# Patient Record
Sex: MALE | Race: WHITE | NOT HISPANIC OR LATINO | Employment: FULL TIME | URBAN - METROPOLITAN AREA
[De-identification: names, ages, dates, MRNs, and addresses within clinical notes are randomized per-mention and may not be internally consistent; named-entity substitution may affect disease eponyms.]

---

## 2024-04-15 ENCOUNTER — APPOINTMENT (OUTPATIENT)
Dept: RADIOLOGY | Facility: HOSPITAL | Age: 39
End: 2024-04-15
Attending: STUDENT IN AN ORGANIZED HEALTH CARE EDUCATION/TRAINING PROGRAM
Payer: OTHER MISCELLANEOUS

## 2024-04-15 ENCOUNTER — HOSPITAL ENCOUNTER (EMERGENCY)
Facility: HOSPITAL | Age: 39
Discharge: HOME | End: 2024-04-15
Attending: STUDENT IN AN ORGANIZED HEALTH CARE EDUCATION/TRAINING PROGRAM | Admitting: STUDENT IN AN ORGANIZED HEALTH CARE EDUCATION/TRAINING PROGRAM
Payer: OTHER MISCELLANEOUS

## 2024-04-15 VITALS
RESPIRATION RATE: 18 BRPM | HEART RATE: 78 BPM | HEIGHT: 70 IN | BODY MASS INDEX: 40.09 KG/M2 | TEMPERATURE: 98.8 F | WEIGHT: 280 LBS | SYSTOLIC BLOOD PRESSURE: 186 MMHG | OXYGEN SATURATION: 98 % | DIASTOLIC BLOOD PRESSURE: 119 MMHG

## 2024-04-15 DIAGNOSIS — S61.422A LACERATION OF LEFT HAND WITH FOREIGN BODY, INITIAL ENCOUNTER: ICD-10-CM

## 2024-04-15 PROCEDURE — 73130 X-RAY EXAM OF HAND: CPT | Mod: LT

## 2024-04-15 PROCEDURE — 12002 RPR S/N/AX/GEN/TRNK2.6-7.5CM: CPT

## 2024-04-15 PROCEDURE — 99283 EMERGENCY DEPT VISIT LOW MDM: CPT

## 2024-04-15 RX ORDER — CEPHALEXIN 500 MG/1
500 CAPSULE ORAL 4 TIMES DAILY
Qty: 28 CAPSULE | Refills: 0 | Status: SHIPPED | OUTPATIENT
Start: 2024-04-15 | End: 2024-04-22

## 2024-04-15 ASSESSMENT — ACTIVITIES OF DAILY LIVING (ADL)
ADLS_ACUITY_SCORE: 33
ADLS_ACUITY_SCORE: 35

## 2024-04-15 NOTE — Clinical Note
Mak Weiss was seen and treated in our emergency department on 4/15/2024.  He may return to work on 04/18/2024.       If you have any questions or concerns, please don't hesitate to call.      Ohl, Daniel Pepper, DO

## 2024-04-16 NOTE — DISCHARGE INSTRUCTIONS
Continue to wash your hand at least twice daily with warm soap and water.  Keep it covered while at work.  Follow-up with [facility] orthopedics listed next week for suture removal.

## 2024-04-16 NOTE — ED PROVIDER NOTES
Emergency Department Encounter         FINAL IMPRESSION:  Hand laceration        ED COURSE AND MEDICAL DECISION MAKING       ED Course as of 04/15/24 2144   Mon Apr 15, 2024   2143 Patient is a healthy 38-year-old here with a left hand injury.  Right hand dominant.  Lacerations just over the middle finger MCP knuckle.  The laceration is dirty.  It was washed vigorously here as well as debrided.  The laceration does go down to the tendon however there is no obvious tendon injury and the finger has full range of motion on extension.  Will obtain x-ray to rule out underlying fracture, prophylactically treated with antibiotics and have him follow-up with hand surgery       Additional ED Course Timeline:  9:36 PM I met with the patient, obtained history, performed an initial exam, and discussed options and plan for diagnostics and treatment here in the ED.         Medical Decision Making  Obtained supplemental history:Supplemental history obtained?: No  Reviewed external records: External records reviewed?: Documented in chart and Other: [facility]  Care impacted by chronic illness:N/A  Care significantly affected by social determinants of health:Access to Medical Care  Did you consider but not order tests?: Work up considered but not performed and documented in chart, if applicable  Did you interpret images independently?: Independent interpretation of ECG and images noted in documentation, when applicable.  Consultation discussion with other provider:Did you involve another provider (consultant, , pharmacy, etc.)?: No  Discharge. I prescribed additional prescription strength medication(s) as charted. See documentation for any additional details.              EKG        Critical Care     Performed by: Daniel Ramirez or    Authorized by: Daniel Ramirez  Total critical care time:  minutes  Critical care was necessary to treat or prevent imminent or life-threatening deterioration of the following conditions:   Critical care was time  spent personally by me on the following activities: development of treatment plan with patient or surrogate, discussions with consultants, examination of patient, evaluation of patient's response to treatment, obtaining history from patient or surrogate, ordering and performing treatments and interventions, ordering and review of laboratory studies, ordering and review of radiographic studies, re-evaluation of patient's condition and monitoring for potential decompensation.  Critical care time was exclusive of separately billable procedures and treating other patients.'    At the conclusion of the encounter I discussed the results of all the tests and the disposition. The questions were answered. The patient or family acknowledged understanding and was agreeable with the care plan.                  MEDICATIONS GIVEN IN THE EMERGENCY DEPARTMENT:  Medications - No data to display    NEW PRESCRIPTIONS STARTED AT TODAY'S ED VISIT:  New Prescriptions    No medications on file       HPI     Patient information obtained from: The patient    Use of : N/A     Mak Weiss is a 38 year old male with no pertinent history who presents to this ED via walk-in for evaluation of laceration.    Per [facility] records, the patient's most recent Td/Tdap received on 11/01/2014.    The patient received a 1 inch laceration to his left hand just below middle MCP joint after an accidental mishandle of a straight liner sanding belt. He cleaned the wound with tap water, soap, and antiseptic and wrapped it with dressing. He is able to move his left hand without any difficulty.     Otherwise, the patient denied numbness, and any other medical complaints or concerns at this time.      REVIEW OF SYSTEMS:  Review of Systems   Constitutional: Negative for fever, malaise  HEENT: Negative runny nose, sore throat, ear pain, neck pain  Respiratory: Negative for shortness of breath, cough, congestion  Cardiovascular: Negative for chest pain,  "leg edema  Gastrointestinal: Negative for abdominal distention, abdominal pain, constipation, vomiting, nausea, diarrhea  Genitourinary: Negative for dysuria and hematuria.   Integument: Negative for rash, skin breakdown  Neurological: Negative for paresthesias, numbness, weakness, headache.  Musculoskeletal: Negative for joint pain, joint swelling. 1 inch laceration to left hand, just below the knuckle.      All other systems reviewed and are negative.          MEDICAL HISTORY     History reviewed. No pertinent past medical history.    History reviewed. No pertinent surgical history.         No current outpatient medications on file.          PHYSICAL EXAM     BP (!) 186/119   Pulse 78   Temp 98.8  F (37.1  C)   Resp 18   Ht 1.778 m (5' 10\")   Wt 127 kg (280 lb)   SpO2 98%   BMI 40.18 kg/m        PHYSICAL EXAM:     General: Patient appears well, nontoxic, comfortable  HEENT: Moist mucous membranes,  No head trauma.    Musculoskeletal: 4 cm laceration overlying the dorsal aspect of the third MCP joint on the left hand.  Exposure of subcutaneous tissue as well as tendon with no injury appreciated.  Full range of motion of the finger including extension  Neurological: Alert and oriented, grossly neurologically intact.  Psychological: Normal affect and mood.  Integument: No rashes appreciated          RESULTS       Labs Ordered and Resulted from Time of ED Arrival to Time of ED Departure - No data to display    No orders to display                     PROCEDURES:  Procedures:  [facility]    -Laceration Repair    Date/Time: 4/15/2024 9:42 PM    Performed by: Daniel Ramirez DO  Authorized by: Daniel Ramirez DO    Risks, benefits and alternatives discussed.    LACERATION DETAILS     Location:  Hand    Hand location:  L hand, dorsum    TREATMENT:     Amount of cleaning:  Extensive    Irrigation solution:  Sterile water    SKIN REPAIR     Repair method:  Sutures    Suture size:  4-0    " Number of sutures:  3         I, Aldo Heller am serving as a scribe to document services personally performed by Daniel Ramirez DO, based on my observations and the provider's statements to me.  I, Daniel Ramirez DO, attest that Aldo Heller is acting in a scribe capacity, has observed my performance of the services and has documented them in accordance with my direction.    Daniel Ramirez DO  Emergency Medicine  [facility] EMERGENCY DEPARTMENT       Daniel Ramirez DO  04/16/24 0150

## 2024-04-16 NOTE — ED TRIAGE NOTES
Pt was at work when he Straight Liner - 6 ft sanding belt. Pt hand slipped. 1 inch laceration to L hand just under knuckles.     Pt cleaned out with tap water soap and antiseptic. Wrapped, bleeding controlled. Pt able to bend hand, denies numbness.      Unknown last tdap

## 2024-04-18 ENCOUNTER — TRANSFERRED RECORDS (OUTPATIENT)
Dept: HEALTH INFORMATION MANAGEMENT | Facility: CLINIC | Age: 39
End: 2024-04-18

## 2024-05-24 ENCOUNTER — TRANSFERRED RECORDS (OUTPATIENT)
Dept: HEALTH INFORMATION MANAGEMENT | Facility: CLINIC | Age: 39
End: 2024-05-24

## 2024-08-18 ENCOUNTER — HOSPITAL ENCOUNTER (OUTPATIENT)
Facility: CLINIC | Age: 39
Discharge: HOME | End: 2024-08-20
Attending: EMERGENCY MEDICINE | Admitting: SPECIALIST

## 2024-08-18 ENCOUNTER — APPOINTMENT (OUTPATIENT)
Dept: ULTRASOUND IMAGING | Facility: CLINIC | Age: 39
End: 2024-08-18
Attending: EMERGENCY MEDICINE

## 2024-08-18 DIAGNOSIS — I10 UNCONTROLLED HYPERTENSION: ICD-10-CM

## 2024-08-18 DIAGNOSIS — I10 HYPERTENSION, UNSPECIFIED TYPE: ICD-10-CM

## 2024-08-18 DIAGNOSIS — K80.20 SYMPTOMATIC CHOLELITHIASIS: Primary | ICD-10-CM

## 2024-08-18 LAB
ALBUMIN SERPL BCG-MCNC: 4.8 G/DL (ref 3.5–5.2)
ALP SERPL-CCNC: 58 U/L (ref 40–150)
ALT SERPL W P-5'-P-CCNC: 34 U/L (ref 0–70)
ANION GAP SERPL CALCULATED.3IONS-SCNC: 11 MMOL/L (ref 7–15)
AST SERPL W P-5'-P-CCNC: 21 U/L (ref 0–45)
BASOPHILS # BLD AUTO: 0.1 10E3/UL (ref 0–0.2)
BASOPHILS NFR BLD AUTO: 1 %
BILIRUB SERPL-MCNC: 1.8 MG/DL
BUN SERPL-MCNC: 10.2 MG/DL (ref 6–20)
CALCIUM SERPL-MCNC: 8.8 MG/DL (ref 8.8–10.4)
CHLORIDE SERPL-SCNC: 103 MMOL/L (ref 98–107)
CREAT SERPL-MCNC: 0.98 MG/DL (ref 0.67–1.17)
EGFRCR SERPLBLD CKD-EPI 2021: >90 ML/MIN/1.73M2
EOSINOPHIL # BLD AUTO: 0.1 10E3/UL (ref 0–0.7)
EOSINOPHIL NFR BLD AUTO: 1 %
ERYTHROCYTE [DISTWIDTH] IN BLOOD BY AUTOMATED COUNT: 13 % (ref 10–15)
GLUCOSE SERPL-MCNC: 108 MG/DL (ref 70–99)
HCO3 SERPL-SCNC: 27 MMOL/L (ref 22–29)
HCT VFR BLD AUTO: 44.4 % (ref 40–53)
HGB BLD-MCNC: 15.1 G/DL (ref 13.3–17.7)
IMM GRANULOCYTES # BLD: 0.1 10E3/UL
IMM GRANULOCYTES NFR BLD: 1 %
LIPASE SERPL-CCNC: 21 U/L (ref 13–60)
LYMPHOCYTES # BLD AUTO: 2.2 10E3/UL (ref 0.8–5.3)
LYMPHOCYTES NFR BLD AUTO: 21 %
MCH RBC QN AUTO: 29.9 PG (ref 26.5–33)
MCHC RBC AUTO-ENTMCNC: 34 G/DL (ref 31.5–36.5)
MCV RBC AUTO: 88 FL (ref 78–100)
MONOCYTES # BLD AUTO: 0.8 10E3/UL (ref 0–1.3)
MONOCYTES NFR BLD AUTO: 8 %
NEUTROPHILS # BLD AUTO: 7.3 10E3/UL (ref 1.6–8.3)
NEUTROPHILS NFR BLD AUTO: 69 %
NRBC # BLD AUTO: 0 10E3/UL
NRBC BLD AUTO-RTO: 0 /100
PLATELET # BLD AUTO: 136 10E3/UL (ref 150–450)
POTASSIUM SERPL-SCNC: 3.6 MMOL/L (ref 3.4–5.3)
PROT SERPL-MCNC: 8.1 G/DL (ref 6.4–8.3)
RBC # BLD AUTO: 5.05 10E6/UL (ref 4.4–5.9)
SODIUM SERPL-SCNC: 141 MMOL/L (ref 135–145)
WBC # BLD AUTO: 10.5 10E3/UL (ref 4–11)

## 2024-08-18 PROCEDURE — 99285 EMERGENCY DEPT VISIT HI MDM: CPT | Mod: 25

## 2024-08-18 PROCEDURE — 85025 COMPLETE CBC W/AUTO DIFF WBC: CPT | Performed by: EMERGENCY MEDICINE

## 2024-08-18 PROCEDURE — 80053 COMPREHEN METABOLIC PANEL: CPT | Performed by: EMERGENCY MEDICINE

## 2024-08-18 PROCEDURE — 76705 ECHO EXAM OF ABDOMEN: CPT

## 2024-08-18 PROCEDURE — 96361 HYDRATE IV INFUSION ADD-ON: CPT

## 2024-08-18 PROCEDURE — 250N000011 HC RX IP 250 OP 636: Performed by: EMERGENCY MEDICINE

## 2024-08-18 PROCEDURE — 96374 THER/PROPH/DIAG INJ IV PUSH: CPT

## 2024-08-18 PROCEDURE — 36415 COLL VENOUS BLD VENIPUNCTURE: CPT | Performed by: EMERGENCY MEDICINE

## 2024-08-18 PROCEDURE — 258N000003 HC RX IP 258 OP 636: Performed by: EMERGENCY MEDICINE

## 2024-08-18 PROCEDURE — 83690 ASSAY OF LIPASE: CPT | Performed by: EMERGENCY MEDICINE

## 2024-08-18 PROCEDURE — 96375 TX/PRO/DX INJ NEW DRUG ADDON: CPT | Mod: XU

## 2024-08-18 RX ORDER — KETOROLAC TROMETHAMINE 15 MG/ML
15 INJECTION, SOLUTION INTRAMUSCULAR; INTRAVENOUS ONCE
Status: COMPLETED | OUTPATIENT
Start: 2024-08-18 | End: 2024-08-18

## 2024-08-18 RX ORDER — ONDANSETRON 4 MG/1
4 TABLET, ORALLY DISINTEGRATING ORAL ONCE
Status: COMPLETED | OUTPATIENT
Start: 2024-08-18 | End: 2024-08-18

## 2024-08-18 RX ADMIN — KETOROLAC TROMETHAMINE 15 MG: 15 INJECTION, SOLUTION INTRAMUSCULAR; INTRAVENOUS at 23:11

## 2024-08-18 RX ADMIN — ONDANSETRON 4 MG: 4 TABLET, ORALLY DISINTEGRATING ORAL at 23:05

## 2024-08-18 RX ADMIN — SODIUM CHLORIDE 1000 ML: 9 INJECTION, SOLUTION INTRAVENOUS at 23:05

## 2024-08-18 ASSESSMENT — COLUMBIA-SUICIDE SEVERITY RATING SCALE - C-SSRS
6. HAVE YOU EVER DONE ANYTHING, STARTED TO DO ANYTHING, OR PREPARED TO DO ANYTHING TO END YOUR LIFE?: NO
1. IN THE PAST MONTH, HAVE YOU WISHED YOU WERE DEAD OR WISHED YOU COULD GO TO SLEEP AND NOT WAKE UP?: NO
2. HAVE YOU ACTUALLY HAD ANY THOUGHTS OF KILLING YOURSELF IN THE PAST MONTH?: NO

## 2024-08-18 ASSESSMENT — ACTIVITIES OF DAILY LIVING (ADL): ADLS_ACUITY_SCORE: 33

## 2024-08-18 NOTE — LETTER
[facility] PACU  [address] [address]  Phone: [phone]  Fax: [phone]    August 19, 2024        Mak Weiss  [address] [place] [id number]          To whom it may concern:    RE: Mak Weiss    Patient was seen from 08/19-08/20 for medical treatment and evaluation. He is to abstain from heavy lifting greater than 20 lbs x 2 weeks and may resume activity as tolerated following 09/02/2024.    Please contact me for questions or concerns.      Sincerely,      Dinora Conte PA-C

## 2024-08-19 ENCOUNTER — ANESTHESIA (OUTPATIENT)
Dept: SURGERY | Facility: CLINIC | Age: 39
End: 2024-08-19

## 2024-08-19 ENCOUNTER — ANESTHESIA EVENT (OUTPATIENT)
Dept: SURGERY | Facility: CLINIC | Age: 39
End: 2024-08-19

## 2024-08-19 PROBLEM — I10 UNCONTROLLED HYPERTENSION: Status: ACTIVE | Noted: 2024-08-19

## 2024-08-19 PROBLEM — I10 HYPERTENSION, UNSPECIFIED TYPE: Status: ACTIVE | Noted: 2024-08-19

## 2024-08-19 PROBLEM — K80.20 SYMPTOMATIC CHOLELITHIASIS: Status: ACTIVE | Noted: 2024-08-19

## 2024-08-19 LAB
APTT PPP: 31 SECONDS (ref 22–38)
ATRIAL RATE - MUSE: 49 BPM
ATRIAL RATE - MUSE: 54 BPM
DIASTOLIC BLOOD PRESSURE - MUSE: 80 MMHG
DIASTOLIC BLOOD PRESSURE - MUSE: NORMAL MMHG
INR PPP: 1.08 (ref 0.85–1.15)
INTERPRETATION ECG - MUSE: NORMAL
INTERPRETATION ECG - MUSE: NORMAL
P AXIS - MUSE: 36 DEGREES
P AXIS - MUSE: NORMAL DEGREES
PR INTERVAL - MUSE: 170 MS
PR INTERVAL - MUSE: NORMAL MS
QRS DURATION - MUSE: 102 MS
QRS DURATION - MUSE: 108 MS
QT - MUSE: 470 MS
QT - MUSE: 480 MS
QTC - MUSE: 406 MS
QTC - MUSE: 433 MS
R AXIS - MUSE: -5 DEGREES
R AXIS - MUSE: 31 DEGREES
SYSTOLIC BLOOD PRESSURE - MUSE: 153 MMHG
SYSTOLIC BLOOD PRESSURE - MUSE: NORMAL MMHG
T AXIS - MUSE: 14 DEGREES
T AXIS - MUSE: 21 DEGREES
VENTRICULAR RATE- MUSE: 45 BPM
VENTRICULAR RATE- MUSE: 49 BPM

## 2024-08-19 PROCEDURE — 999N000141 HC STATISTIC PRE-PROCEDURE NURSING ASSESSMENT: Performed by: SPECIALIST

## 2024-08-19 PROCEDURE — 250N000013 HC RX MED GY IP 250 OP 250 PS 637: Performed by: SPECIALIST

## 2024-08-19 PROCEDURE — 47562 LAPAROSCOPIC CHOLECYSTECTOMY: CPT | Mod: AS

## 2024-08-19 PROCEDURE — 370N000017 HC ANESTHESIA TECHNICAL FEE, PER MIN: Performed by: SPECIALIST

## 2024-08-19 PROCEDURE — 250N000011 HC RX IP 250 OP 636: Performed by: SPECIALIST

## 2024-08-19 PROCEDURE — 96366 THER/PROPH/DIAG IV INF ADDON: CPT

## 2024-08-19 PROCEDURE — 999N000157 HC STATISTIC RCP TIME EA 10 MIN

## 2024-08-19 PROCEDURE — 250N000011 HC RX IP 250 OP 636: Performed by: FAMILY MEDICINE

## 2024-08-19 PROCEDURE — 250N000011 HC RX IP 250 OP 636: Performed by: NURSE ANESTHETIST, CERTIFIED REGISTERED

## 2024-08-19 PROCEDURE — 360N000076 HC SURGERY LEVEL 3, PER MIN: Performed by: SPECIALIST

## 2024-08-19 PROCEDURE — 272N000004 HC RX 272: Performed by: SPECIALIST

## 2024-08-19 PROCEDURE — 36415 COLL VENOUS BLD VENIPUNCTURE: CPT | Performed by: EMERGENCY MEDICINE

## 2024-08-19 PROCEDURE — 258N000003 HC RX IP 258 OP 636: Performed by: EMERGENCY MEDICINE

## 2024-08-19 PROCEDURE — 250N000011 HC RX IP 250 OP 636: Performed by: ANESTHESIOLOGY

## 2024-08-19 PROCEDURE — 710N000010 HC RECOVERY PHASE 1, LEVEL 2, PER MIN: Performed by: SPECIALIST

## 2024-08-19 PROCEDURE — 999N000254 HC STATISTIC VENTILATOR TRANSFER

## 2024-08-19 PROCEDURE — 250N000013 HC RX MED GY IP 250 OP 250 PS 637: Performed by: EMERGENCY MEDICINE

## 2024-08-19 PROCEDURE — 96365 THER/PROPH/DIAG IV INF INIT: CPT

## 2024-08-19 PROCEDURE — 258N000003 HC RX IP 258 OP 636: Performed by: ANESTHESIOLOGY

## 2024-08-19 PROCEDURE — 99207 PR APP CREDIT; MD BILLING SHARED VISIT: CPT | Performed by: FAMILY MEDICINE

## 2024-08-19 PROCEDURE — 93005 ELECTROCARDIOGRAM TRACING: CPT

## 2024-08-19 PROCEDURE — G0378 HOSPITAL OBSERVATION PER HR: HCPCS

## 2024-08-19 PROCEDURE — 93005 ELECTROCARDIOGRAM TRACING: CPT | Performed by: FAMILY MEDICINE

## 2024-08-19 PROCEDURE — 85610 PROTHROMBIN TIME: CPT | Performed by: EMERGENCY MEDICINE

## 2024-08-19 PROCEDURE — 258N000003 HC RX IP 258 OP 636: Performed by: NURSE ANESTHETIST, CERTIFIED REGISTERED

## 2024-08-19 PROCEDURE — 99205 OFFICE O/P NEW HI 60 MIN: CPT | Performed by: FAMILY MEDICINE

## 2024-08-19 PROCEDURE — 250N000013 HC RX MED GY IP 250 OP 250 PS 637: Performed by: FAMILY MEDICINE

## 2024-08-19 PROCEDURE — 250N000009 HC RX 250: Performed by: NURSE ANESTHETIST, CERTIFIED REGISTERED

## 2024-08-19 PROCEDURE — 272N000001 HC OR GENERAL SUPPLY STERILE: Performed by: SPECIALIST

## 2024-08-19 PROCEDURE — 88304 TISSUE EXAM BY PATHOLOGIST: CPT | Mod: TC | Performed by: SPECIALIST

## 2024-08-19 PROCEDURE — 258N000003 HC RX IP 258 OP 636: Performed by: FAMILY MEDICINE

## 2024-08-19 PROCEDURE — 93010 ELECTROCARDIOGRAM REPORT: CPT | Performed by: INTERNAL MEDICINE

## 2024-08-19 PROCEDURE — 47562 LAPAROSCOPIC CHOLECYSTECTOMY: CPT | Performed by: SPECIALIST

## 2024-08-19 PROCEDURE — 258N000003 HC RX IP 258 OP 636: Performed by: SPECIALIST

## 2024-08-19 PROCEDURE — 99221 1ST HOSP IP/OBS SF/LOW 40: CPT | Mod: 57 | Performed by: SPECIALIST

## 2024-08-19 PROCEDURE — 85730 THROMBOPLASTIN TIME PARTIAL: CPT | Performed by: EMERGENCY MEDICINE

## 2024-08-19 PROCEDURE — 94660 CPAP INITIATION&MGMT: CPT

## 2024-08-19 PROCEDURE — 96361 HYDRATE IV INFUSION ADD-ON: CPT | Mod: XU

## 2024-08-19 PROCEDURE — 88304 TISSUE EXAM BY PATHOLOGIST: CPT | Mod: 26 | Performed by: PATHOLOGY

## 2024-08-19 PROCEDURE — 250N000025 HC SEVOFLURANE, PER MIN: Performed by: SPECIALIST

## 2024-08-19 RX ORDER — HYDROMORPHONE HCL IN WATER/PF 6 MG/30 ML
0.4 PATIENT CONTROLLED ANALGESIA SYRINGE INTRAVENOUS EVERY 5 MIN PRN
Status: DISCONTINUED | OUTPATIENT
Start: 2024-08-19 | End: 2024-08-19 | Stop reason: HOSPADM

## 2024-08-19 RX ORDER — HYDROMORPHONE HYDROCHLORIDE 1 MG/ML
0.5 INJECTION, SOLUTION INTRAMUSCULAR; INTRAVENOUS; SUBCUTANEOUS EVERY 30 MIN PRN
Status: DISCONTINUED | OUTPATIENT
Start: 2024-08-19 | End: 2024-08-20

## 2024-08-19 RX ORDER — FENTANYL CITRATE 50 UG/ML
25 INJECTION, SOLUTION INTRAMUSCULAR; INTRAVENOUS EVERY 5 MIN PRN
Status: DISCONTINUED | OUTPATIENT
Start: 2024-08-19 | End: 2024-08-19 | Stop reason: HOSPADM

## 2024-08-19 RX ORDER — AMOXICILLIN 250 MG
1 CAPSULE ORAL 2 TIMES DAILY
Status: DISCONTINUED | OUTPATIENT
Start: 2024-08-19 | End: 2024-08-20 | Stop reason: HOSPADM

## 2024-08-19 RX ORDER — PIPERACILLIN SODIUM, TAZOBACTAM SODIUM 3; .375 G/15ML; G/15ML
3.38 INJECTION, POWDER, LYOPHILIZED, FOR SOLUTION INTRAVENOUS ONCE
Status: COMPLETED | OUTPATIENT
Start: 2024-08-19 | End: 2024-08-19

## 2024-08-19 RX ORDER — OXYCODONE HYDROCHLORIDE 5 MG/1
10 TABLET ORAL
Status: DISCONTINUED | OUTPATIENT
Start: 2024-08-19 | End: 2024-08-19

## 2024-08-19 RX ORDER — SODIUM CHLORIDE, SODIUM LACTATE, POTASSIUM CHLORIDE, AND CALCIUM CHLORIDE .6; .31; .03; .02 G/100ML; G/100ML; G/100ML; G/100ML
IRRIGANT IRRIGATION PRN
Status: DISCONTINUED | OUTPATIENT
Start: 2024-08-19 | End: 2024-08-19 | Stop reason: HOSPADM

## 2024-08-19 RX ORDER — CEFAZOLIN SODIUM/WATER 3 G/30 ML
3 SYRINGE (ML) INTRAVENOUS
Status: COMPLETED | OUTPATIENT
Start: 2024-08-19 | End: 2024-08-19

## 2024-08-19 RX ORDER — METOPROLOL TARTRATE 25 MG/1
25 TABLET, FILM COATED ORAL 2 TIMES DAILY
Status: DISCONTINUED | OUTPATIENT
Start: 2024-08-19 | End: 2024-08-20 | Stop reason: HOSPADM

## 2024-08-19 RX ORDER — ACETAMINOPHEN 325 MG/1
975 TABLET ORAL ONCE
Status: COMPLETED | OUTPATIENT
Start: 2024-08-19 | End: 2024-08-19

## 2024-08-19 RX ORDER — IBUPROFEN 600 MG/1
600 TABLET, FILM COATED ORAL
Status: DISCONTINUED | OUTPATIENT
Start: 2024-08-19 | End: 2024-08-20 | Stop reason: HOSPADM

## 2024-08-19 RX ORDER — MORPHINE SULFATE 2 MG/ML
1-2 INJECTION, SOLUTION INTRAMUSCULAR; INTRAVENOUS EVERY 4 HOURS PRN
Status: DISCONTINUED | OUTPATIENT
Start: 2024-08-19 | End: 2024-08-20 | Stop reason: HOSPADM

## 2024-08-19 RX ORDER — ACETAMINOPHEN 650 MG/1
650 SUPPOSITORY RECTAL EVERY 4 HOURS PRN
Status: DISCONTINUED | OUTPATIENT
Start: 2024-08-19 | End: 2024-08-20 | Stop reason: HOSPADM

## 2024-08-19 RX ORDER — ACETAMINOPHEN 325 MG/1
650 TABLET ORAL EVERY 4 HOURS PRN
Status: DISCONTINUED | OUTPATIENT
Start: 2024-08-19 | End: 2024-08-20 | Stop reason: HOSPADM

## 2024-08-19 RX ORDER — SODIUM CHLORIDE 9 MG/ML
INJECTION, SOLUTION INTRAVENOUS CONTINUOUS
Status: DISCONTINUED | OUTPATIENT
Start: 2024-08-19 | End: 2024-08-19

## 2024-08-19 RX ORDER — HYDROMORPHONE HCL IN WATER/PF 6 MG/30 ML
0.2 PATIENT CONTROLLED ANALGESIA SYRINGE INTRAVENOUS EVERY 5 MIN PRN
Status: DISCONTINUED | OUTPATIENT
Start: 2024-08-19 | End: 2024-08-19 | Stop reason: HOSPADM

## 2024-08-19 RX ORDER — PIPERACILLIN SODIUM, TAZOBACTAM SODIUM 3; .375 G/15ML; G/15ML
3.38 INJECTION, POWDER, LYOPHILIZED, FOR SOLUTION INTRAVENOUS EVERY 8 HOURS
Status: DISCONTINUED | OUTPATIENT
Start: 2024-08-19 | End: 2024-08-20 | Stop reason: HOSPADM

## 2024-08-19 RX ORDER — NALOXONE HYDROCHLORIDE 0.4 MG/ML
0.1 INJECTION, SOLUTION INTRAMUSCULAR; INTRAVENOUS; SUBCUTANEOUS
Status: DISCONTINUED | OUTPATIENT
Start: 2024-08-19 | End: 2024-08-19 | Stop reason: HOSPADM

## 2024-08-19 RX ORDER — HYDROCODONE BITARTRATE AND ACETAMINOPHEN 5; 325 MG/1; MG/1
1-2 TABLET ORAL EVERY 4 HOURS PRN
Qty: 20 TABLET | Refills: 0 | Status: SHIPPED | OUTPATIENT
Start: 2024-08-19 | End: 2024-08-20

## 2024-08-19 RX ORDER — HYDRALAZINE HYDROCHLORIDE 20 MG/ML
10 INJECTION INTRAMUSCULAR; INTRAVENOUS EVERY 6 HOURS PRN
Status: DISCONTINUED | OUTPATIENT
Start: 2024-08-19 | End: 2024-08-20 | Stop reason: HOSPADM

## 2024-08-19 RX ORDER — NALOXONE HYDROCHLORIDE 0.4 MG/ML
0.2 INJECTION, SOLUTION INTRAMUSCULAR; INTRAVENOUS; SUBCUTANEOUS
Status: DISCONTINUED | OUTPATIENT
Start: 2024-08-19 | End: 2024-08-20 | Stop reason: HOSPADM

## 2024-08-19 RX ORDER — LIDOCAINE HYDROCHLORIDE 10 MG/ML
INJECTION, SOLUTION INFILTRATION; PERINEURAL PRN
Status: DISCONTINUED | OUTPATIENT
Start: 2024-08-19 | End: 2024-08-19

## 2024-08-19 RX ORDER — ONDANSETRON 4 MG/1
4 TABLET, ORALLY DISINTEGRATING ORAL EVERY 6 HOURS PRN
Status: DISCONTINUED | OUTPATIENT
Start: 2024-08-19 | End: 2024-08-20 | Stop reason: HOSPADM

## 2024-08-19 RX ORDER — ONDANSETRON 4 MG/1
4 TABLET, ORALLY DISINTEGRATING ORAL EVERY 30 MIN PRN
Status: DISCONTINUED | OUTPATIENT
Start: 2024-08-19 | End: 2024-08-19

## 2024-08-19 RX ORDER — POLYETHYLENE GLYCOL 3350 17 G/17G
17 POWDER, FOR SOLUTION ORAL DAILY
Status: DISCONTINUED | OUTPATIENT
Start: 2024-08-20 | End: 2024-08-20 | Stop reason: HOSPADM

## 2024-08-19 RX ORDER — ONDANSETRON 2 MG/ML
INJECTION INTRAMUSCULAR; INTRAVENOUS PRN
Status: DISCONTINUED | OUTPATIENT
Start: 2024-08-19 | End: 2024-08-19

## 2024-08-19 RX ORDER — NALOXONE HYDROCHLORIDE 0.4 MG/ML
0.1 INJECTION, SOLUTION INTRAMUSCULAR; INTRAVENOUS; SUBCUTANEOUS
Status: DISCONTINUED | OUTPATIENT
Start: 2024-08-19 | End: 2024-08-19

## 2024-08-19 RX ORDER — PROPOFOL 10 MG/ML
INJECTION, EMULSION INTRAVENOUS PRN
Status: DISCONTINUED | OUTPATIENT
Start: 2024-08-19 | End: 2024-08-19

## 2024-08-19 RX ORDER — DEXAMETHASONE SODIUM PHOSPHATE 4 MG/ML
4 INJECTION, SOLUTION INTRA-ARTICULAR; INTRALESIONAL; INTRAMUSCULAR; INTRAVENOUS; SOFT TISSUE
Status: DISCONTINUED | OUTPATIENT
Start: 2024-08-19 | End: 2024-08-19 | Stop reason: HOSPADM

## 2024-08-19 RX ORDER — HYDRALAZINE HYDROCHLORIDE 20 MG/ML
10 INJECTION INTRAMUSCULAR; INTRAVENOUS EVERY 10 MIN PRN
Status: DISCONTINUED | OUTPATIENT
Start: 2024-08-19 | End: 2024-08-19 | Stop reason: HOSPADM

## 2024-08-19 RX ORDER — SODIUM CHLORIDE 9 MG/ML
INJECTION, SOLUTION INTRAVENOUS CONTINUOUS
Status: DISCONTINUED | OUTPATIENT
Start: 2024-08-19 | End: 2024-08-20 | Stop reason: HOSPADM

## 2024-08-19 RX ORDER — LIDOCAINE 40 MG/G
CREAM TOPICAL
Status: DISCONTINUED | OUTPATIENT
Start: 2024-08-19 | End: 2024-08-19 | Stop reason: HOSPADM

## 2024-08-19 RX ORDER — ONDANSETRON 2 MG/ML
4 INJECTION INTRAMUSCULAR; INTRAVENOUS EVERY 30 MIN PRN
Status: DISCONTINUED | OUTPATIENT
Start: 2024-08-19 | End: 2024-08-19 | Stop reason: HOSPADM

## 2024-08-19 RX ORDER — BUPIVACAINE HYDROCHLORIDE 2.5 MG/ML
INJECTION, SOLUTION INFILTRATION; PERINEURAL
Status: DISCONTINUED
Start: 2024-08-19 | End: 2024-08-19 | Stop reason: HOSPADM

## 2024-08-19 RX ORDER — OXYCODONE HYDROCHLORIDE 5 MG/1
5-10 TABLET ORAL EVERY 4 HOURS PRN
Status: DISCONTINUED | OUTPATIENT
Start: 2024-08-19 | End: 2024-08-20 | Stop reason: HOSPADM

## 2024-08-19 RX ORDER — OXYCODONE AND ACETAMINOPHEN 5; 325 MG/1; MG/1
1 TABLET ORAL EVERY 4 HOURS PRN
Status: DISCONTINUED | OUTPATIENT
Start: 2024-08-19 | End: 2024-08-20 | Stop reason: HOSPADM

## 2024-08-19 RX ORDER — LIDOCAINE 40 MG/G
CREAM TOPICAL
Status: DISCONTINUED | OUTPATIENT
Start: 2024-08-19 | End: 2024-08-20 | Stop reason: HOSPADM

## 2024-08-19 RX ORDER — ONDANSETRON 2 MG/ML
4 INJECTION INTRAMUSCULAR; INTRAVENOUS EVERY 6 HOURS PRN
Status: DISCONTINUED | OUTPATIENT
Start: 2024-08-19 | End: 2024-08-20 | Stop reason: HOSPADM

## 2024-08-19 RX ORDER — ONDANSETRON 4 MG/1
4 TABLET, ORALLY DISINTEGRATING ORAL EVERY 6 HOURS PRN
Status: DISCONTINUED | OUTPATIENT
Start: 2024-08-19 | End: 2024-08-19

## 2024-08-19 RX ORDER — BUPIVACAINE HYDROCHLORIDE 2.5 MG/ML
INJECTION, SOLUTION INFILTRATION; PERINEURAL PRN
Status: DISCONTINUED | OUTPATIENT
Start: 2024-08-19 | End: 2024-08-19 | Stop reason: HOSPADM

## 2024-08-19 RX ORDER — PROCHLORPERAZINE MALEATE 10 MG
10 TABLET ORAL EVERY 6 HOURS PRN
Status: DISCONTINUED | OUTPATIENT
Start: 2024-08-19 | End: 2024-08-20 | Stop reason: HOSPADM

## 2024-08-19 RX ORDER — SODIUM CHLORIDE, SODIUM LACTATE, POTASSIUM CHLORIDE, CALCIUM CHLORIDE 600; 310; 30; 20 MG/100ML; MG/100ML; MG/100ML; MG/100ML
INJECTION, SOLUTION INTRAVENOUS CONTINUOUS
Status: DISCONTINUED | OUTPATIENT
Start: 2024-08-19 | End: 2024-08-19 | Stop reason: HOSPADM

## 2024-08-19 RX ORDER — PANTOPRAZOLE SODIUM 40 MG/1
40 TABLET, DELAYED RELEASE ORAL
Status: DISCONTINUED | OUTPATIENT
Start: 2024-08-19 | End: 2024-08-20 | Stop reason: HOSPADM

## 2024-08-19 RX ORDER — ONDANSETRON 4 MG/1
4 TABLET, ORALLY DISINTEGRATING ORAL EVERY 30 MIN PRN
Status: DISCONTINUED | OUTPATIENT
Start: 2024-08-19 | End: 2024-08-19 | Stop reason: HOSPADM

## 2024-08-19 RX ORDER — ONDANSETRON 2 MG/ML
4 INJECTION INTRAMUSCULAR; INTRAVENOUS EVERY 6 HOURS PRN
Status: DISCONTINUED | OUTPATIENT
Start: 2024-08-19 | End: 2024-08-19

## 2024-08-19 RX ORDER — FENTANYL CITRATE 50 UG/ML
INJECTION, SOLUTION INTRAMUSCULAR; INTRAVENOUS PRN
Status: DISCONTINUED | OUTPATIENT
Start: 2024-08-19 | End: 2024-08-19

## 2024-08-19 RX ORDER — ACETAMINOPHEN 325 MG/1
975 TABLET ORAL EVERY 8 HOURS
Status: DISCONTINUED | OUTPATIENT
Start: 2024-08-19 | End: 2024-08-20 | Stop reason: HOSPADM

## 2024-08-19 RX ORDER — DEXAMETHASONE SODIUM PHOSPHATE 10 MG/ML
INJECTION, SOLUTION INTRAMUSCULAR; INTRAVENOUS PRN
Status: DISCONTINUED | OUTPATIENT
Start: 2024-08-19 | End: 2024-08-19

## 2024-08-19 RX ORDER — HYDROCODONE BITARTRATE AND ACETAMINOPHEN 5; 325 MG/1; MG/1
1 TABLET ORAL
Status: COMPLETED | OUTPATIENT
Start: 2024-08-19 | End: 2024-08-19

## 2024-08-19 RX ORDER — NALOXONE HYDROCHLORIDE 0.4 MG/ML
0.4 INJECTION, SOLUTION INTRAMUSCULAR; INTRAVENOUS; SUBCUTANEOUS
Status: DISCONTINUED | OUTPATIENT
Start: 2024-08-19 | End: 2024-08-20 | Stop reason: HOSPADM

## 2024-08-19 RX ORDER — PANTOPRAZOLE SODIUM 40 MG/1
40 TABLET, DELAYED RELEASE ORAL
Status: DISCONTINUED | OUTPATIENT
Start: 2024-08-19 | End: 2024-08-19

## 2024-08-19 RX ORDER — ONDANSETRON 2 MG/ML
4 INJECTION INTRAMUSCULAR; INTRAVENOUS EVERY 30 MIN PRN
Status: DISCONTINUED | OUTPATIENT
Start: 2024-08-19 | End: 2024-08-19

## 2024-08-19 RX ORDER — DEXAMETHASONE SODIUM PHOSPHATE 4 MG/ML
4 INJECTION, SOLUTION INTRA-ARTICULAR; INTRALESIONAL; INTRAMUSCULAR; INTRAVENOUS; SOFT TISSUE
Status: DISCONTINUED | OUTPATIENT
Start: 2024-08-19 | End: 2024-08-19

## 2024-08-19 RX ORDER — OXYCODONE HYDROCHLORIDE 5 MG/1
5 TABLET ORAL
Status: DISCONTINUED | OUTPATIENT
Start: 2024-08-19 | End: 2024-08-19

## 2024-08-19 RX ORDER — LISINOPRIL AND HYDROCHLOROTHIAZIDE 20; 25 MG/1; MG/1
1 TABLET ORAL ONCE
Status: COMPLETED | OUTPATIENT
Start: 2024-08-19 | End: 2024-08-19

## 2024-08-19 RX ORDER — ACETAMINOPHEN 325 MG/1
650 TABLET ORAL EVERY 4 HOURS PRN
Status: DISCONTINUED | OUTPATIENT
Start: 2024-08-22 | End: 2024-08-20 | Stop reason: HOSPADM

## 2024-08-19 RX ORDER — FENTANYL CITRATE 50 UG/ML
50 INJECTION, SOLUTION INTRAMUSCULAR; INTRAVENOUS EVERY 5 MIN PRN
Status: DISCONTINUED | OUTPATIENT
Start: 2024-08-19 | End: 2024-08-19 | Stop reason: HOSPADM

## 2024-08-19 RX ORDER — BISACODYL 10 MG
10 SUPPOSITORY, RECTAL RECTAL DAILY PRN
Status: DISCONTINUED | OUTPATIENT
Start: 2024-08-22 | End: 2024-08-20 | Stop reason: HOSPADM

## 2024-08-19 RX ADMIN — PIPERACILLIN AND TAZOBACTAM 3.38 G: 3; .375 INJECTION, POWDER, FOR SOLUTION INTRAVENOUS at 08:46

## 2024-08-19 RX ADMIN — METOPROLOL TARTRATE 25 MG: 25 TABLET, FILM COATED ORAL at 20:43

## 2024-08-19 RX ADMIN — HYDROCODONE BITARTRATE AND ACETAMINOPHEN 1 TABLET: 5; 325 TABLET ORAL at 16:12

## 2024-08-19 RX ADMIN — DEXAMETHASONE SODIUM PHOSPHATE 4 MG: 10 INJECTION, SOLUTION INTRAMUSCULAR; INTRAVENOUS at 10:44

## 2024-08-19 RX ADMIN — PROPOFOL 100 MG: 10 INJECTION, EMULSION INTRAVENOUS at 11:08

## 2024-08-19 RX ADMIN — Medication 3 G: at 10:33

## 2024-08-19 RX ADMIN — MIDAZOLAM 2 MG: 1 INJECTION INTRAMUSCULAR; INTRAVENOUS at 10:41

## 2024-08-19 RX ADMIN — PIPERACILLIN AND TAZOBACTAM 3.38 G: 3; .375 INJECTION, POWDER, FOR SOLUTION INTRAVENOUS at 01:28

## 2024-08-19 RX ADMIN — ROCURONIUM BROMIDE 10 MG: 10 INJECTION, SOLUTION INTRAVENOUS at 11:21

## 2024-08-19 RX ADMIN — OXYCODONE HYDROCHLORIDE AND ACETAMINOPHEN 1 TABLET: 5; 325 TABLET ORAL at 22:29

## 2024-08-19 RX ADMIN — ROCURONIUM BROMIDE 20 MG: 10 INJECTION, SOLUTION INTRAVENOUS at 11:08

## 2024-08-19 RX ADMIN — SENNOSIDES AND DOCUSATE SODIUM 1 TABLET: 50; 8.6 TABLET ORAL at 20:43

## 2024-08-19 RX ADMIN — ROCURONIUM BROMIDE 50 MG: 10 INJECTION, SOLUTION INTRAVENOUS at 10:44

## 2024-08-19 RX ADMIN — FENTANYL CITRATE 100 MCG: 50 INJECTION INTRAMUSCULAR; INTRAVENOUS at 10:44

## 2024-08-19 RX ADMIN — SODIUM CHLORIDE: 9 INJECTION, SOLUTION INTRAVENOUS at 00:41

## 2024-08-19 RX ADMIN — HYDROMORPHONE HYDROCHLORIDE 1 MG: 1 INJECTION, SOLUTION INTRAMUSCULAR; INTRAVENOUS; SUBCUTANEOUS at 10:58

## 2024-08-19 RX ADMIN — ACETAMINOPHEN 975 MG: 325 TABLET ORAL at 09:43

## 2024-08-19 RX ADMIN — METOPROLOL TARTRATE 25 MG: 25 TABLET, FILM COATED ORAL at 08:46

## 2024-08-19 RX ADMIN — SUGAMMADEX 300 MG: 100 INJECTION, SOLUTION INTRAVENOUS at 11:55

## 2024-08-19 RX ADMIN — PROPOFOL 30 MG: 10 INJECTION, EMULSION INTRAVENOUS at 12:15

## 2024-08-19 RX ADMIN — SODIUM CHLORIDE: 9 INJECTION, SOLUTION INTRAVENOUS at 01:33

## 2024-08-19 RX ADMIN — PROPOFOL 200 MG: 10 INJECTION, EMULSION INTRAVENOUS at 10:44

## 2024-08-19 RX ADMIN — HYDRALAZINE HYDROCHLORIDE 10 MG: 20 INJECTION, SOLUTION INTRAMUSCULAR; INTRAVENOUS at 10:27

## 2024-08-19 RX ADMIN — LISINOPRIL AND HYDROCHLOROTHIAZIDE 1 TABLET: 25; 20 TABLET ORAL at 00:38

## 2024-08-19 RX ADMIN — PANTOPRAZOLE SODIUM 40 MG: 40 TABLET, DELAYED RELEASE ORAL at 08:46

## 2024-08-19 RX ADMIN — PIPERACILLIN AND TAZOBACTAM 3.38 G: 3; .375 INJECTION, POWDER, FOR SOLUTION INTRAVENOUS at 22:31

## 2024-08-19 RX ADMIN — SODIUM CHLORIDE: 9 INJECTION, SOLUTION INTRAVENOUS at 16:12

## 2024-08-19 RX ADMIN — OXYCODONE HYDROCHLORIDE 5 MG: 5 TABLET ORAL at 02:52

## 2024-08-19 RX ADMIN — LIDOCAINE HYDROCHLORIDE 50 MG: 10 INJECTION, SOLUTION INFILTRATION; PERINEURAL at 10:44

## 2024-08-19 RX ADMIN — DEXMEDETOMIDINE HYDROCHLORIDE 12 MCG: 100 INJECTION, SOLUTION INTRAVENOUS at 11:10

## 2024-08-19 RX ADMIN — ONDANSETRON 4 MG: 2 INJECTION INTRAMUSCULAR; INTRAVENOUS at 11:44

## 2024-08-19 RX ADMIN — SODIUM CHLORIDE, POTASSIUM CHLORIDE, SODIUM LACTATE AND CALCIUM CHLORIDE: 600; 310; 30; 20 INJECTION, SOLUTION INTRAVENOUS at 13:31

## 2024-08-19 ASSESSMENT — ACTIVITIES OF DAILY LIVING (ADL)
ADLS_ACUITY_SCORE: 35
ADLS_ACUITY_SCORE: 35
ADLS_ACUITY_SCORE: 19
ADLS_ACUITY_SCORE: 35
ADLS_ACUITY_SCORE: 18
ADLS_ACUITY_SCORE: 35
ADLS_ACUITY_SCORE: 19
ADLS_ACUITY_SCORE: 35
ADLS_ACUITY_SCORE: 36
ADLS_ACUITY_SCORE: 35
ADLS_ACUITY_SCORE: 19
ADLS_ACUITY_SCORE: 19
ADLS_ACUITY_SCORE: 35

## 2024-08-19 ASSESSMENT — ENCOUNTER SYMPTOMS: ABDOMINAL PAIN: 1

## 2024-08-19 NOTE — ED PROVIDER NOTES
NAME: Mak Weiss  AGE: 39 year old male  YOB: 1985  MRN: [id number]  EVALUATION DATE & TIME: No admission date for patient encounter.    PCP: No Ref-Primary, Physician    ED PROVIDER: Jose Angel Santiago M.D.      Chief Complaint   Patient presents with    Abdominal Pain     FINAL IMPRESSION:  1. Symptomatic cholelithiasis    2. Uncontrolled hypertension      MEDICAL DECISION MAKING:    10:03 PM I met with the patient, obtained history, performed an initial exam, and discussed options and plan for diagnostics and treatment here in the ED.   11:44 PM I rechecked and updated the patient on results. He is feeling better.  12:08 AM The pain is still coming in waves. He is interested in general surgery consult for cholecystectomy.  12:15 AM I spoke with Dr. Roro Mcdowell from general surgery. She will add him to the OR schedule tomorrow. She would like his blood pressure lowered and for him to be admitted.   12:30 AM I spoke with the accepting hospitalist, Dr. Alvarado.   Patient was clinically assessed and consented to treatment. After assessment, medical decision making and workup were discussed with the patient. The patient was agreeable to plan for testing, workup, and treatment.  Pertinent Labs & Imaging studies reviewed. (See chart for details)       Medical Decision Making  Obtained supplemental history:Supplemental history obtained?: Documented in chart  Reviewed external records: External records reviewed?: Documented in chart  Care impacted by chronic illness:N/A  Care significantly affected by social determinants of health:Access to Medical Care  Did you consider but not order tests?: In addition to work-up documented, I considered the following work up:   Did you interpret images independently?: Independent interpretation of ECG and images noted in documentation, when applicable.  Consultation discussion with other provider:Did you involve another provider (consultant, MH, pharmacy, etc.)?:  I discussed the care with another health care provider, see documentation for details.  Admit.    Mak Weiss is a 39 year old male who presents with abdominal pain.   Differential diagnosis includes but not limited to biliary colic, cholecystitis, choledocholithiasis, pancreatitis.  Patient with multiple episodes of similar pain in the last week.  He comes in today for right upper quadrant pain.  Patient's labs are drawn from triage and did show slight elevation of bilirubin.  LFTs were otherwise normal and no leukocytosis.  Patient comfortable but still slightly tender after Toradol and Zofran.  Patient and I discussed his blood pressure which has been elevated the entire time here but he has not seen a primary care doctor in several years.  He does believe he has high blood pressure but has not been on medications nor recommended for any.  I would consider starting some here but some of this could be pain.  Ultrasound was obtained and showed biliary colic with no signs of  cholecystitis.  Patient  and having intermittent crampy waves of pain.  Likely patient with biliary colic but with recurrent episodes of pain would be consistent with symptomatic cholelithiasis and likely need for cholecystectomy.  We discussed this and I did speak with the general surgery who will add patient onto the schedule for tomorrow.  They did recommend admission to medicine to get his blood pressure control and preop clearance.  Patient will be agreeable to this as he does not wish recurrence of the pain and does intermittently Having waves of pain that do come down on their own which likely is related to continued irritation of the gallbladder from the stones.  Will plan for admission and I spoke with hospitalist for admission and started oral blood pressure medications.    0 minutes of critical care time    MEDICATIONS GIVEN IN THE EMERGENCY:  Medications   HYDROmorphone (PF) (DILAUDID) injection 0.5 mg (has no  administration in time range)   hydrALAZINE (APRESOLINE) injection 10 mg (has no administration in time range)   metoprolol tartrate (LOPRESSOR) tablet 25 mg (has no administration in time range)   lidocaine 1 % 0.1-1 mL (has no administration in time range)   lidocaine (LMX4) cream (has no administration in time range)   sodium chloride (PF) 0.9% PF flush 3 mL (3 mLs Intracatheter Not Given 8/19/24 0134)   sodium chloride (PF) 0.9% PF flush 3 mL (has no administration in time range)   sodium chloride 0.9 % infusion ( Intravenous $New Bag 8/19/24 0133)   acetaminophen (TYLENOL) tablet 650 mg (has no administration in time range)     Or   acetaminophen (TYLENOL) Suppository 650 mg (has no administration in time range)   ondansetron (ZOFRAN ODT) ODT tab 4 mg (has no administration in time range)     Or   ondansetron (ZOFRAN) injection 4 mg (has no administration in time range)   piperacillin-tazobactam (ZOSYN) 3.375 g vial to attach to  mL bag (has no administration in time range)   oxyCODONE (ROXICODONE) tablet 5-10 mg (has no administration in time range)   morphine (PF) injection 1-2 mg (has no administration in time range)   pantoprazole (PROTONIX) EC tablet 40 mg (has no administration in time range)   ketorolac (TORADOL) injection 15 mg (15 mg Intravenous $Given 8/18/24 2311)   ondansetron (ZOFRAN ODT) ODT tab 4 mg (4 mg Oral $Given 8/18/24 2305)   sodium chloride 0.9% BOLUS 1,000 mL (0 mLs Intravenous Stopped 8/19/24 0016)   lisinopril-hydrochlorothiazide (ZESTORETIC) 20-25 MG per tablet 1 tablet (1 tablet Oral $Given 8/19/24 0038)   piperacillin-tazobactam (ZOSYN) 3.375 g vial to attach to  mL bag (3.375 g Intravenous $New Bag 8/19/24 0128)       NEW PRESCRIPTIONS STARTED AT TODAY'S ER VISIT:  New Prescriptions    No medications on file          =================================================================    HPI    Patient information was obtained from: patient     Use of : N/A          Mak Weiss is a 39 year old male with no past medical history who presents with RUQ pain.    Over the last month the patient has had 3 episodes of epigastric pain that radiates around from his RUQ to his right flank. The most recent episode prior to today was on 8/18 from 1:00-7:00 AM. Today he had a glass of milk which triggered the pain again. Hot showers have helped the pain. He denies any other complaints at this time. He has not seen a PCP in a few years. His blood pressure is high now and he normally plays video games to lower it.     REVIEW OF SYSTEMS   Review of Systems   Gastrointestinal:  Positive for abdominal pain (RUQ and epigastric).   All other systems reviewed and are negative.       PAST MEDICAL HISTORY:  History reviewed. No pertinent past medical history.    PAST SURGICAL HISTORY:  History reviewed. No pertinent surgical history.    CURRENT MEDICATIONS:      Current Facility-Administered Medications:     acetaminophen (TYLENOL) tablet 650 mg, 650 mg, Oral, Q4H PRN **OR** acetaminophen (TYLENOL) Suppository 650 mg, 650 mg, Rectal, Q4H PRN, Gladis Alvarado MD    hydrALAZINE (APRESOLINE) injection 10 mg, 10 mg, Intravenous, Q6H PRN, Gladis Alvarado MD    HYDROmorphone (PF) (DILAUDID) injection 0.5 mg, 0.5 mg, Intravenous, Q30 Min PRN, Jose Angel Santiago MD    lidocaine (LMX4) cream, , Topical, Q1H PRN, Gladis Alvarado MD    lidocaine 1 % 0.1-1 mL, 0.1-1 mL, Other, Q1H PRN, Gladis Alvarado MD    metoprolol tartrate (LOPRESSOR) tablet 25 mg, 25 mg, Oral, BID, Gladis Alvarado MD    morphine (PF) injection 1-2 mg, 1-2 mg, Intravenous, Q4H PRN, Gladis Alvarado MD    ondansetron (ZOFRAN ODT) ODT tab 4 mg, 4 mg, Oral, Q6H PRN **OR** ondansetron (ZOFRAN) injection 4 mg, 4 mg, Intravenous, Q6H PRN, Gladis Alvarado MD    oxyCODONE (ROXICODONE) tablet 5-10 mg, 5-10 mg, Oral, Q4H PRN, Gladis Alvarado MD    pantoprazole (PROTONIX) EC tablet 40 mg, 40 mg, Oral, QAM AC, Gladis Alvarado MD     "piperacillin-tazobactam (ZOSYN) 3.375 g vial to attach to  mL bag, 3.375 g, Intravenous, Q8H, Gladis Alvarado MD    sodium chloride (PF) 0.9% PF flush 3 mL, 3 mL, Intracatheter, Q8H, Gladis Alvarado MD    sodium chloride (PF) 0.9% PF flush 3 mL, 3 mL, Intracatheter, q1 min prn, Gladis Alvarado MD    sodium chloride 0.9 % infusion, , Intravenous, Continuous, Gladis Alvarado MD, Last Rate: 100 mL/hr at 08/19/24 0133, New Bag at 08/19/24 0133  No current outpatient medications on file.    ALLERGIES:  No Known Allergies    FAMILY HISTORY:  History reviewed. No pertinent family history.    SOCIAL HISTORY:   Social History     Socioeconomic History    Marital status:        PHYSICAL EXAM:    Vitals: BP (!) 153/80 (BP Location: Left arm, Patient Position: Fowlers, Cuff Size: Adult Large)   Pulse 50   Temp 98.7  F (37.1  C) (Oral)   Resp 18   Ht 1.778 m (5' 10\")   Wt 129.3 kg (285 lb)   SpO2 96%   BMI 40.89 kg/m     Physical Exam  Vitals and nursing note reviewed.   Constitutional:       General: He is not in acute distress.     Appearance: He is well-developed. He is obese. He is not ill-appearing or toxic-appearing.   HENT:      Head: Normocephalic.   Eyes:      General: No scleral icterus.  Cardiovascular:      Rate and Rhythm: Normal rate and regular rhythm.      Heart sounds: Normal heart sounds.   Pulmonary:      Effort: Pulmonary effort is normal. No respiratory distress.      Breath sounds: Normal breath sounds.   Abdominal:      General: Abdomen is flat.      Palpations: Abdomen is soft.      Tenderness: There is abdominal tenderness in the right upper quadrant and epigastric area. There is no right CVA tenderness, left CVA tenderness, guarding or rebound.      Hernia: No hernia is present.   Skin:     General: Skin is warm and dry.      Coloration: Skin is not jaundiced.   Neurological:      General: No focal deficit present.      Mental Status: He is alert.   Psychiatric:         Behavior: " Behavior normal.        LAB:  All pertinent labs reviewed and interpreted.  Labs Ordered and Resulted from Time of ED Arrival to Time of ED Departure   COMPREHENSIVE METABOLIC PANEL - Abnormal       Result Value    Sodium 141      Potassium 3.6      Carbon Dioxide (CO2) 27      Anion Gap 11      Urea Nitrogen 10.2      Creatinine 0.98      GFR Estimate >90      Calcium 8.8      Chloride 103      Glucose 108 (*)     Alkaline Phosphatase 58      AST 21      ALT 34      Protein Total 8.1      Albumin 4.8      Bilirubin Total 1.8 (*)    CBC WITH PLATELETS AND DIFFERENTIAL - Abnormal    WBC Count 10.5      RBC Count 5.05      Hemoglobin 15.1      Hematocrit 44.4      MCV 88      MCH 29.9      MCHC 34.0      RDW 13.0      Platelet Count 136 (*)     % Neutrophils 69      % Lymphocytes 21      % Monocytes 8      % Eosinophils 1      % Basophils 1      % Immature Granulocytes 1      NRBCs per 100 WBC 0      Absolute Neutrophils 7.3      Absolute Lymphocytes 2.2      Absolute Monocytes 0.8      Absolute Eosinophils 0.1      Absolute Basophils 0.1      Absolute Immature Granulocytes 0.1      Absolute NRBCs 0.0     LIPASE - Normal    Lipase 21     INR - Normal    INR 1.08     PARTIAL THROMBOPLASTIN TIME - Normal    aPTT 31       RADIOLOGY:  US Abdomen Limited (RUQ)   Final Result   IMPRESSION:   1.  Cholelithiasis.      2.  Hepatic steatosis.              EKG:   None    PROCEDURES:   Procedures       I, Barrington Ibarra, am serving as a scribe to document services personally performed by Dr. Jose Angel Santiago  based on my observation and the provider's statements to me. I, Jose Angel Santiago MD attest that Barrington Ibarra is acting in a scribe capacity, has observed my performance of the services and has documented them in accordance with my direction.      Jose Angel Santiago M.D.  Emergency Medicine  [facility] Emergency Department       Jose Angel Santiago MD  08/19/24 5546

## 2024-08-19 NOTE — PLAN OF CARE
PRIMARY DIAGNOSIS: ACUTE PAIN  OUTPATIENT/OBSERVATION GOALS TO BE MET BEFORE DISCHARGE:  1. Pain Status: Improved-controlled with oral pain medications.    2. Return to near baseline physical activity: Yes    3. Cleared for discharge by consultants (if involved): No    Discharge Planner Nurse   Safe discharge environment identified: Yes  Barriers to discharge: Yes       Entered by: Jonna Samuels RN 08/19/2024 3:59 AM     Please review provider order for any additional goals.   Nurse to notify provider when observation goals have been met and patient is ready for discharge.     Goal Outcome Evaluation:    Problem: Adult Inpatient Plan of Care  Goal: Optimal Comfort and Wellbeing  Outcome: Progressing     Problem: Pain Acute  Goal: Optimal Pain Control and Function  Outcome: Progressing     Problem: Comorbidity Management  Goal: Blood Pressure in Desired Range  Outcome: Progressing

## 2024-08-19 NOTE — CONSULTS
Consultation - Surgery  Mak Gordon,  1985, MRN [id number]    Admitting Dx: Uncontrolled hypertension [I10]  Symptomatic cholelithiasis [K80.20]    PCP: No Ref-Primary, Physician, None   Code status:  Full Code       Extended Emergency Contact Information  Primary Emergency Contact: MICHELLE KAUR  Mobile Phone: [phone]  Relation: Spouse  Secondary Emergency Contact: FRED GORDON  Mobile Phone: [phone]  Relation: Father       Assessment and Plan   Impression:  Biliary colic      Plan:  Laparoscopic cholecystectomy.  Patient should be able to be discharged home immediately following surgery.           Chief Complaint <principal problem not specified>       HPI    We have been requested by the hospitalist service to evaluate Mak Gordon for biliary colic.  This is a 39 year old year old male with intermittent abdominal pain over the last month.  He had his third episode last night so came to the emergency room.  Feels okay this morning.  No previous abdominal surgeries.  Ultrasound shows a contracted gallbladder with stones.  LFTs are normal.       Medical History  Patient Active Problem List   Diagnosis    Symptomatic cholelithiasis    Hypertension, unspecified type       [unfilled] Surgical History  History reviewed. No pertinent surgical history.     Social History  Social History     Tobacco Use    Smoking status: Never    Smokeless tobacco: Never   Vaping Use    Vaping status: Never Used   Substance Use Topics    Alcohol use: Yes     Comment: 1-2/ months    Drug use: Never       Allergies  No Known Allergies Family History  Reviewed, and family history is not on file.  The Family history is not pertinent to the patients chief complaint. Psychosocial Needs  Social History     Social History Narrative    Not on file     Additional psychosocial needs reviewed per nursing assessment.     Prior to Admission Medications   No current outpatient medications        Review of Systems:  Pertinent items  are noted in HPI. Physical Exam:  Temp:  [97.8  F (36.6  C)-98.7  F (37.1  C)] 98.3  F (36.8  C)  Pulse:  [43-77] 62  Resp:  [16-26] 18  BP: (153-212)/(80-96) 185/95  SpO2:  [91 %-98 %] 91 %    General appearance: alert, appears stated age, cooperative, and morbidly obese  Eyes: No scleral icterus  Lungs: Breathing comfortably.  No shortness of breath  Heart: regular rate and rhythm  Abdomen: Obese.  Soft, nontender    Skin: Skin color, texture, turgor normal. No rashes or lesions  Neurologic: Grossly normal       Pertinent Labs  Lab Results: personally reviewed.   Lab Results   Component Value Date    WBC 10.5 08/18/2024    HGB 15.1 08/18/2024    HCT 44.4 08/18/2024    MCV 88 08/18/2024     08/18/2024   LFTs normal     Pertinent Radiology  Radiology Results: Personally reviewed image/s and Personally reviewed impression/s  EKG Results: not reviewed.

## 2024-08-19 NOTE — ANESTHESIA PREPROCEDURE EVALUATION
"Anesthesia Pre-Procedure Evaluation    Patient: Mak Weiss   MRN: [id number] : 1985        Procedure : Procedure(s):  CHOLECYSTECTOMY, LAPAROSCOPIC          History reviewed. No pertinent past medical history.   History reviewed. No pertinent surgical history.   No Known Allergies   Social History     Tobacco Use    Smoking status: Not on file    Smokeless tobacco: Not on file   Substance Use Topics    Alcohol use: Not on file      Wt Readings from Last 1 Encounters:   24 129.3 kg (285 lb)        Anesthesia Evaluation            ROS/MED HX  ENT/Pulmonary:  - neg pulmonary ROS     Neurologic:  - neg neurologic ROS     Cardiovascular:  - neg cardiovascular ROS   (+)  hypertension- -   -  - -                                      METS/Exercise Tolerance:     Hematologic:       Musculoskeletal:       GI/Hepatic:  - neg GI/hepatic ROS     Renal/Genitourinary:  - neg Renal ROS     Endo:  - neg endo ROS   (+)               Obesity,       Psychiatric/Substance Use:       Infectious Disease:  - neg infectious disease ROS     Malignancy:  - neg malignancy ROS     Other:  - neg other ROS          Physical Exam    Airway        Mallampati: II    Neck ROM: full     Respiratory Devices and Support         Dental           Cardiovascular   cardiovascular exam normal          Pulmonary   pulmonary exam normal                OUTSIDE LABS:  CBC:   Lab Results   Component Value Date    WBC 10.5 2024    HGB 15.1 2024    HCT 44.4 2024     (L) 2024     BMP:   Lab Results   Component Value Date     2024    POTASSIUM 3.6 2024    CHLORIDE 103 2024    CO2 27 2024    BUN 10.2 2024    CR 0.98 2024     (H) 2024     COAGS:   Lab Results   Component Value Date    PTT 31 2024    INR 1.08 2024     POC: No results found for: \"BGM\", \"HCG\", \"HCGS\"  HEPATIC:   Lab Results   Component Value Date    ALBUMIN 4.8 2024    PROTTOTAL 8.1 " "08/18/2024    ALT 34 08/18/2024    AST 21 08/18/2024    ALKPHOS 58 08/18/2024    BILITOTAL 1.8 (H) 08/18/2024     OTHER:   Lab Results   Component Value Date    JESSICA 8.8 08/18/2024    LIPASE 21 08/18/2024       Anesthesia Plan    ASA Status:  3    NPO Status:  NPO Appropriate    Anesthesia Type: General.     - Airway: ETT   Induction: Intravenous.           Consents    Anesthesia Plan(s) and associated risks, benefits, and realistic alternatives discussed. Questions answered and patient/representative(s) expressed understanding.     - Discussed:     - Discussed with:  Patient            Postoperative Care       PONV prophylaxis: Ondansetron (or other 5HT-3), Dexamethasone or Solumedrol     Comments:               Steve Felix MD    I have reviewed the pertinent notes and labs in the chart from the past 30 days and (re)examined the patient.  Any updates or changes from those notes are reflected in this note.              # Severe Obesity: Estimated body mass index is 40.89 kg/m  as calculated from the following:    Height as of this encounter: 1.778 m (5' 10\").    Weight as of this encounter: 129.3 kg (285 lb).      "

## 2024-08-19 NOTE — PROVIDER NOTIFICATION
Dr. Louie notified patient's umbilical site oozing blood. RN put pressure dressing over saturated dressing. Per Dr. Louie, PA will be to patient's bedside soon to re-dress. RN will continue to monitor patient.

## 2024-08-19 NOTE — PROGRESS NOTES
[facility] MEDICINE  PROGRESS NOTE     Code Status: Full Code  Procedure(s):  CHOLECYSTECTOMY, LAPAROSCOPIC     Identification/Summary:   Mak Weiss is a 39 year old  male with history of sleep apnea, compliant with CPAP use, HTN off of meds, came with epigastric and right upper quadrant abdominal pain off and on for last 2 days.  Found to have cholelithiasis.  General surgery consulted and will have laparoscopic cholecystectomy due to symptomatic cholelithiasis.  Noted hypertensive urgency systolic reading over 200.  Received lisinopril hydrochlorothiazide 20/25 mg with some improvement.  Also given metoprolol 25 mg p.o. this morning prior to surgery.  Admission EKG reported as junctional rhythm.  Updated OR nurse to repeat EKG and notify MDA.    Assessment and Plan:    Symptomatic cholelithiasis  Abdominal ultrasound-multiple gallstones without cholecystitis  General Surgery consult for laparoscopic cholecystectomy for symptomatic cholelithiasis  Started on IV Zosyn empirically.     Hypertensive urgency  Patient admits to a history of hypertension.  Had been on lisinopril in the past.  Stopped taking medications on his own.  At ED arrival initial blood pressure 204/95.  Temporarily improved after receiving lisinopril hydrochlorothiazide 20/25 mg x 1.  IV hydralazine 10 mg every 6 hours as needed available.  Will give first dose metoprolol 25 mg twice a day prior to surgery.  Monitor blood pressure closely following surgery.    Possible junctional rhythm  Admission EKG reported as junctional rhythm.  I do see some intermittent P waves but not consistently.  Repeat stat EKG now.  Updated OR nurse who will obtain the EKG and notify MDA.     Severe obesity Body mass index is 40.89 kg/m .  Modification of lifestyle for weight loss     Obstructive sleep apnea  Compliant with CPAP use.  Wife is to bring from home.    Hepatic steatosis  Noted.    Anticoagulation   Pneumatic  "Compression Devices    Fluids: LR  Pain meds: Dilaudid as needed  Therapy: N/A  Vicente:Not present  Lines: None       Current Diet  Orders Placed This Encounter      NPO for Medical/Clinical Reasons Except for: Meds, Ice Chips    Supplements  None    Barriers to Discharge: Pending cholecystectomy, EKG reevaluation    Disposition: Plan to stay overnight potential discharge 8/20  Medically Ready for Discharge: Anticipated Tomorrow       Clinically Significant Risk Factors Present on Admission                  # Hypertension: Noted on problem list         # Severe Obesity: Estimated body mass index is 40.89 kg/m  as calculated from the following:    Height as of this encounter: 1.778 m (5' 10\").    Weight as of this encounter: 129.3 kg (285 lb).                    Interval History/Subjective:  Patient's abdominal pain is improved but still quite tender in the right upper quadrant.  Was quite severe prior to admission.  Notes that he does have a history of high blood pressure.  Had been on lisinopril previously but had stopped the medication on his own.  Has been compliant with CPAP for his sleep apnea.  Wife is planning to get the device from home.  Questions answered to verbalized satisfaction.      Last 24H PRN:     oxyCODONE (ROXICODONE) tablet 5-10 mg, 5 mg at 08/19/24 0252    Physical Exam/Objective:  Temp:  [97.8  F (36.6  C)-98.7  F (37.1  C)] 98.3  F (36.8  C)  Pulse:  [43-77] 62  Resp:  [16-26] 18  BP: (153-212)/(80-96) 185/95  SpO2:  [91 %-98 %] 91 %  Wt Readings from Last 4 Encounters:   08/18/24 129.3 kg (285 lb)   04/15/24 127 kg (280 lb)     Body mass index is 40.89 kg/m .    Constitutional: awake, alert, cooperative, no apparent distress, and appears stated age and morbidly obese  ENT: Normocephalic, without obvious abnormality, atraumatic, external ears without lesions, oral pharynx with moist mucous membranes, tonsils without erythema or exudates, gums normal and good dentition.  Respiratory: No " increased work of breathing, good air exchange, clear to auscultation bilaterally, no crackles or wheezing  Cardiovascular: Normal apical impulse, regular rate and rhythm, normal S1 and S2, no S3 or S4, and no murmur noted  GI: Positive bowel sounds soft nondistended.  Does have right upper quadrant tenderness to palpation.  Obese.  Skin: normal skin color, texture, turgor, no redness, warmth, or swelling, and no rashes  Musculoskeletal: There is no redness, warmth, or swelling of the joints.  Motor strength is 5 out of 5 all extremities bilaterally.  Tone is normal. no lower extremity pitting edema present  Neurologic: Cranial nerves II-XII are grossly intact. Sensory:  Sensory intact  Neuropsychiatric: General: normal, calm, and normal eye contact Level of consciousness: alert / normal Affect: normal Orientation: oriented to self, place, time and situation Memory and insight: normal, memory for past and recent events intact, and thought process normal      Medications:   Personally Reviewed.  Medications   Current Facility-Administered Medications   Medication Dose Route Frequency Provider Last Rate Last Admin    sodium chloride 0.9 % infusion   Intravenous Continuous Gladis Alvarado  mL/hr at 08/19/24 0600 Rate Verify at 08/19/24 0600     Current Facility-Administered Medications   Medication Dose Route Frequency Provider Last Rate Last Admin    metoprolol tartrate (LOPRESSOR) tablet 25 mg  25 mg Oral BID Gladis Alvarado MD   25 mg at 08/19/24 0846    pantoprazole (PROTONIX) EC tablet 40 mg  40 mg Oral QAM AC Gladis Alvarado MD   40 mg at 08/19/24 0846    piperacillin-tazobactam (ZOSYN) 3.375 g vial to attach to  mL bag  3.375 g Intravenous Q8H Gladis Alvarado MD   3.375 g at 08/19/24 0846    sodium chloride (PF) 0.9% PF flush 3 mL  3 mL Intracatheter Q8H Gladis Alvarado MD           Data reviewed today: I personally reviewed all new medications, labs, imaging/diagnostics reports over the past 24  hours. Pertinent findings include:    Imaging:   Recent Results (from the past 24 hour(s))   US Abdomen Limited (RUQ)    Narrative    EXAM: US ABDOMEN LIMITED  LOCATION: [facility]  DATE: 8/18/2024    INDICATION: ruq pain  COMPARISON: None.  TECHNIQUE: Limited abdominal ultrasound.    FINDINGS:    GALLBLADDER: The gallbladder is contracted, limiting evaluation. Multiple gallstones. No pericholecystic fluid. Negative sonographic Delgado's sign.    BILE DUCTS: No biliary dilatation. The common duct measures 5 mm.    LIVER: Increased echogenicity from diffuse fatty infiltration. No focal mass.    RIGHT KIDNEY: No hydronephrosis.    PANCREAS: The visualized portions are normal.    No ascites.      Impression    IMPRESSION:  1.  Cholelithiasis.    2.  Hepatic steatosis.         No results found for this or any previous visit (from the past 4320 hour(s)).    Labs:  US Abdomen Limited (RUQ)   Final Result   IMPRESSION:   1.  Cholelithiasis.      2.  Hepatic steatosis.              Recent Results (from the past 24 hour(s))   Comprehensive metabolic panel    Collection Time: 08/18/24 11:00 PM   Result Value Ref Range    Sodium 141 135 - 145 mmol/L    Potassium 3.6 3.4 - 5.3 mmol/L    Carbon Dioxide (CO2) 27 22 - 29 mmol/L    Anion Gap 11 7 - 15 mmol/L    Urea Nitrogen 10.2 6.0 - 20.0 mg/dL    Creatinine 0.98 0.67 - 1.17 mg/dL    GFR Estimate >90 >60 mL/min/1.73m2    Calcium 8.8 8.8 - 10.4 mg/dL    Chloride 103 98 - 107 mmol/L    Glucose 108 (H) 70 - 99 mg/dL    Alkaline Phosphatase 58 40 - 150 U/L    AST 21 0 - 45 U/L    ALT 34 0 - 70 U/L    Protein Total 8.1 6.4 - 8.3 g/dL    Albumin 4.8 3.5 - 5.2 g/dL    Bilirubin Total 1.8 (H) <=1.2 mg/dL   Lipase    Collection Time: 08/18/24 11:00 PM   Result Value Ref Range    Lipase 21 13 - 60 U/L   CBC with platelets and differential    Collection Time: 08/18/24 11:00 PM   Result Value Ref Range    WBC Count 10.5 4.0 - 11.0 10e3/uL    RBC Count 5.05 4.40 -  5.90 10e6/uL    Hemoglobin 15.1 13.3 - 17.7 g/dL    Hematocrit 44.4 40.0 - 53.0 %    MCV 88 78 - 100 fL    MCH 29.9 26.5 - 33.0 pg    MCHC 34.0 31.5 - 36.5 g/dL    RDW 13.0 10.0 - 15.0 %    Platelet Count 136 (L) 150 - 450 10e3/uL    % Neutrophils 69 %    % Lymphocytes 21 %    % Monocytes 8 %    % Eosinophils 1 %    % Basophils 1 %    % Immature Granulocytes 1 %    NRBCs per 100 WBC 0 <1 /100    Absolute Neutrophils 7.3 1.6 - 8.3 10e3/uL    Absolute Lymphocytes 2.2 0.8 - 5.3 10e3/uL    Absolute Monocytes 0.8 0.0 - 1.3 10e3/uL    Absolute Eosinophils 0.1 0.0 - 0.7 10e3/uL    Absolute Basophils 0.1 0.0 - 0.2 10e3/uL    Absolute Immature Granulocytes 0.1 <=0.4 10e3/uL    Absolute NRBCs 0.0 10e3/uL   INR    Collection Time: 08/19/24 12:38 AM   Result Value Ref Range    INR 1.08 0.85 - 1.15   Partial thromboplastin time    Collection Time: 08/19/24 12:38 AM   Result Value Ref Range    aPTT 31 22 - 38 Seconds   ECG 12-LEAD WITH MUSE (LHE)    Collection Time: 08/19/24  2:33 AM   Result Value Ref Range    Systolic Blood Pressure 153 mmHg    Diastolic Blood Pressure 80 mmHg    Ventricular Rate 45 BPM    Atrial Rate 54 BPM    CO Interval  ms    QRS Duration 102 ms     ms    QTc 406 ms    P Axis  degrees    R AXIS 31 degrees    T Axis 14 degrees    Interpretation ECG       Junctional rhythm  Abnormal ECG  No previous ECGs available  Confirmed by SEE ED PROVIDER NOTE FOR, ECG INTERPRETATION ([id number]),  JESUS ALBERTO DUFFY ([id number]) on 8/19/2024 4:41:46 AM         Pending Labs:  Unresulted Labs Ordered in the Past 30 Days of this Admission       No orders found for last 31 day(s).              Maxwell Garcia MD  Hospitalist  [facility]  [facility]  Phone: #[phone]

## 2024-08-19 NOTE — PROGRESS NOTES
RT transported pt on 3L oxymask, no complications, BIPAP ST placed back on pt, currently 14/8 RR16 25%02. RT following.

## 2024-08-19 NOTE — PHARMACY-ADMISSION MEDICATION HISTORY
Pharmacist Admission Medication History    Admission medication history is complete. The information provided in this note is only as accurate as the sources available at the time of the update.    Medication reconciliation/reorder completed by provider prior to medication history? No    Information Source(s): Patient and CareEverywhere/SureScripts via in-person    Pertinent Information: patient denies taking any prescription, OTC/PRN, or herbal/supplemental medications.    Allergies reviewed with patient and updates made in EHR: yes    Medications available for use during hospital stay: NONE.     Medication History Completed By: Bina Adair RPH 8/19/2024 7:47 AM    No outpatient medications have been marked as taking for the 8/18/24 encounter (Hospital Encounter).

## 2024-08-19 NOTE — OP NOTE
Operative Note    Name:  Mak Weiss  PCP:  No Ref-Primary, Physician  Procedure Date:  8/19/2024       Procedure:  Procedure(s):  CHOLECYSTECTOMY, LAPAROSCOPIC     Pre-Procedure Diagnosis:  Symptomatic cholelithiasis [K80.20]     Post-Procedure Diagnosis:    Chronic     Surgeon(s):  Christine Louie MD     Assistant: SABI Verduzco      Anesthesia Type:  General       Findings:  Chronic cholecystitis    Operative Report:    The patient was properly identified and brought to the operating suite where they were placed in the supine position, general anesthetic was administered and prepped and draped in a sterile fashion.  A small incision was made below the umbilicus and a Veress needle passed into the abdominal cavity which was insufflated with carbon dioxide.  A 5mm trocar port was then placed followed by the camera.  Under direct vision a 10 mm port was placed in the epigastrium and two 5 mm ports in the right upper quadrant.  The gallbladder was visualized.  With traction on the gallbladder dissection was carried out in the triangle of Calot where the cystic duct and artery were carefully  identified, dissected free, clipped and divided.  The gallbladder was removed from the gallbladder bed with electrocautery with no difficulty and pulled up through the epigastric incision.  The port was replaced and the entire area irrigated until clear.  Hemostasis was assured.  All the ports removed and each site closed with subcuticular sutures of 4-0 Monocryl.  Sterile dressings were placed.  Each site was also infiltrated with quarter percent Marcaine for a total of 30 mL.  The patient tolerated the procedure well.  My assistant, SABI Verduzco provided retraction on the gallbladder and exposure with the camera throughout a much more difficult case than typical secondary to the chronic nature of his cholecystitis and his morbid obesity.    Estimated Blood Loss:   20cc    Specimens:    ID Type Source Tests  Collected by Time Destination   1 : Gallbladder Tissue Gallbladder SURGICAL PATHOLOGY EXAM Christine Louie MD 8/19/2024 11:44 AM            Drains:        Complications:    None    Christine Louie MD     Date: 8/19/2024  Time: 12:01 PM

## 2024-08-19 NOTE — ED TRIAGE NOTES
"Epigastric pain radiating to right upper abdomen, wrapping around to back. Patient states he has been having these episodes off and on for a \"few months.\" Patient states hot showers help.      Triage Assessment (Adult)       Row Name 08/18/24 1569          Triage Assessment    Airway WDL WDL        Respiratory WDL    Respiratory WDL WDL        Cardiac WDL    Cardiac WDL WDL        Cognitive/Neuro/Behavioral WDL    Cognitive/Neuro/Behavioral WDL WDL                     "

## 2024-08-19 NOTE — PROVIDER NOTIFICATION
Dr. Felix notified patient O2 sats 94% with artificial airway and on oxymask plus at 6LPM. MDA verbalized understanding. Per MDA, writer to place patient on CPAP. RN contacted [facility] RT. RT verbalized understanding and will come to bedside. RN will continue to monitor patient.

## 2024-08-19 NOTE — PROVIDER NOTIFICATION
"MD notified per Dr. Felix, patient to stay inpatient and not be discharged at this time. Per Dr. Louie, patient to stay under \"admit to obvs\". Writer verbalized understanding.  "

## 2024-08-19 NOTE — ANESTHESIA POSTPROCEDURE EVALUATION
Patient: Mak Weiss    Procedure: Procedure(s):  CHOLECYSTECTOMY, LAPAROSCOPIC       Anesthesia Type:  General    Note:  Disposition: Outpatient; Disposition Change/Cancellation   Postop Pain Control: Uneventful            Sign Out: Well controlled pain   PONV: No   Neuro/Psych: Uneventful            Sign Out: Acceptable/Baseline neuro status   Airway/Respiratory: Uneventful            Sign Out: Acceptable/Baseline resp. status   CV/Hemodynamics: Uneventful            Sign Out: Acceptable CV status; No obvious hypovolemia; No obvious fluid overload   Other NRE:    DID A NON-ROUTINE EVENT OCCUR? No    Event details/Postop Comments:  Patient may have to stay overnight.  Still sleepy and on bipap.           Last vitals:  Vitals Value Taken Time   /80 08/19/24 1400   Temp     Pulse 66 08/19/24 1405   Resp 17 08/19/24 1405   SpO2 98 % 08/19/24 1405   Vitals shown include unfiled device data.    Electronically Signed By: Steve Felix MD  August 19, 2024  2:07 PM

## 2024-08-19 NOTE — H&P
"[facility] MEDICINE ADMISSION HISTORY AND PHYSICAL     Assessment & Plan       Mak Weiss is a 39 year old  male with history of sleep apnea, compliant with CPAP use, came with epigastric and right upper quadrant abdominal pain off and on for last 2 days.  Found to have cholelithiasis.  General surgery is notified.  Will have laparoscopic cholecystectomy due to symptomatic cholelithiasis.  Also found to have hypertensive urgency.  Received lisinopril hydrochlorothiazide 20/25 mg once in ED.    Symptomatic cholelithiasis  Abdominal ultrasound-multiple gallstones without cholecystitis  General Surgery consult for laparoscopic cholecystectomy for symptomatic cholelithiasis  Started on IV Zosyn empirically    Hepatic steatosis    Hypertensive urgency  Status post lisinopril hydrochlorothiazide 20/25 mg once  IV hydralazine 10 mg every 6 hours as needed  Metoprolol 25 mg twice a day, dose will be titrated    Severe obesity Body mass index is 40.89 kg/m .  Modification of lifestyle for weight loss    Obstructive sleep apnea  Compliant with CPAP use      Code full  DVT prophylaxis  No subcu heparin as same-day surgery  Observation admission      Clinically Significant Risk Factors Present on Admission                  # Hypertension: Noted on problem list         # Severe Obesity: Estimated body mass index is 40.89 kg/m  as calculated from the following:    Height as of this encounter: 1.778 m (5' 10\").    Weight as of this encounter: 129.3 kg (285 lb).             Chief Complaint Epigastric and right upper quadrant abdominal pain for 2 days     HISTORY     Mak Weiss is a 39 year old male with  with history of sleep apnea, compliant with CPAP use, came with epigastric and right upper quadrant abdominal pain off and on for last 2 days.  Found to have stable gallstones without acute cholecystitis.  He has intermittent epigastric and right upper quadrant pain for last 2 days.  Had 3 " episodes.  Afebrile.  No nausea or vomiting.  Appetite is stable.  There is no elevating or aggravating factors. General surgery is notified.  Will have laparoscopic cholecystectomy due to symptomatic cholelithiasis.  Also found to have hypertensive urgency.  Received lisinopril hydrochlorothiazide 20/25 mg once in ED. rest of the review of system are negative.  Denies headache, chest pain, breathing difficulty, palpitation, nausea, vomiting, lower abdominal pain, diarrhea, constipation or urinary symptoms.  History is provided by the patient.  Spoke with ED physician.    Past Medical History     Obstructive sleep apnea    Surgical History   History reviewed. No pertinent surgical history.  Family History    Reviewed.  Mother  from metastatic endometrial carcinoma.  Father had cholecystectomy due to cholecystitis  Social History        Lives with family.  Not smoking tobacco.  Not drinking alcohol.  Allergies   No Known Allergies  Prior to Admission Medications      None      Review of Systems     A 12 point comprehensive review of systems was negative except as noted above in HPI.    PHYSICAL EXAMINATION       Vitals      Temp:  [97.8  F (36.6  C)-98.7  F (37.1  C)] 98.7  F (37.1  C)  Pulse:  [50-77] 50  Resp:  [16-18] 18  BP: (191-212)/(93-96) 198/96  SpO2:  [94 %-98 %] 96 %    Examination     GENERAL:  Alert, appears comfortable, in no acute distress, appears stated age, morbidly obese   HEAD:  Normocephalic, without obvious abnormality, atraumatic   EYES:  PERRL, conjunctiva  clear,  EOM's intact   NOSE: Nares normal, mucosa normal, no drainage   THROAT: Lips, mucosa,  gums normal, mouth moist   NECK: Supple, symmetrical, trachea midline   BACK:   Symmetric, no curvature, ROM normal   LUNGS:   Clear to auscultation bilaterally, no rales, rhonchi, or wheezing, symmetric chest rise on inhalation, respirations unlabored   CHEST WALL:  No tenderness or deformity   HEART:  Regular rate and rhythm, S1 and S2  normal, no murmur, rub, or gallop    ABDOMEN:   Soft, epigastric and RUQ mild tenderness, obese,bowel sounds active , no masses, no organomegaly, no rebound or guarding   EXTREMITIES: No BLE edema    SKIN: No rashes in the visualized areas   NEURO: Alert, oriented x 4, moves all four extremities freely, non-focal   PSYCH: Cooperative, behavior is appropriate      Pertinent Lab   Most Recent 3 CBC's:  Recent Labs   Lab Test 08/18/24 2300   WBC 10.5   HGB 15.1   MCV 88   *     Most Recent 3 BMP's:  Recent Labs   Lab Test 08/18/24 2300      POTASSIUM 3.6   CHLORIDE 103   CO2 27   BUN 10.2   CR 0.98   ANIONGAP 11   JESSICA 8.8   *     Most Recent 2 LFT's:  Recent Labs   Lab Test 08/18/24 2300   AST 21   ALT 34   ALKPHOS 58   BILITOTAL 1.8*         Pertinent Radiology     Radiology Results:   Abdomen US  1.  Cholelithiasis.  2.  Hepatic steatosis.    Total time spent 70 min    Gladis Alvarado MD  [facility]  [facility]   Phone: #[phone]

## 2024-08-19 NOTE — ANESTHESIA PROCEDURE NOTES
Airway       Patient location during procedure: OR  Staff -        CRNA: Ofelia Vásquez APRN CRNA       Performed By: CRNA  Consent for Airway        Urgency: elective  Indications and Patient Condition       Indications for airway management: travis-procedural         Mask difficulty assessment: 1 - vent by mask    Final Airway Details       Final airway type: endotracheal airway       Successful airway: ETT - single  Endotracheal Airway Details        ETT size (mm): 8.0       Cuffed: yes       Successful intubation technique: direct laryngoscopy       DL Blade Type: MAC 3       Grade View of Cords: 1       Adjucts: stylet       Position: Right       Measured from: gums/teeth       Secured at (cm): 24       Bite block used: Oral Airway    Post intubation assessment        Placement verified by: capnometry, equal breath sounds and chest rise        Number of attempts at approach: 1       Secured with: tape       Ease of procedure: easy       Dentition: Intact       Dental guard used and removed. Dental Guard Type: Standard White.

## 2024-08-19 NOTE — ANESTHESIA CARE TRANSFER NOTE
Patient: Mak Weiss    Procedure: Procedure(s):  CHOLECYSTECTOMY, LAPAROSCOPIC       Diagnosis: Symptomatic cholelithiasis [K80.20]  Diagnosis Additional Information: No value filed.    Anesthesia Type:   General     Note:    Oropharynx: oropharynx clear of all foreign objects and nasal airway in place  Level of Consciousness: awake  Oxygen Supplementation: face mask  Level of Supplemental Oxygen (L/min / FiO2): 8  Independent Airway: airway patency satisfactory and stable  Dentition: dentition unchanged  Vital Signs Stable: post-procedure vital signs reviewed and stable  Report to RN Given: handoff report given  Patient transferred to: PACU    Handoff Report: Identifed the Patient, Identified the Reponsible Provider, Reviewed the pertinent medical history, Discussed the surgical course, Reviewed Intra-OP anesthesia mangement and issues during anesthesia, Set expectations for post-procedure period and Allowed opportunity for questions and acknowledgement of understanding      Vitals:  Vitals Value Taken Time   /79 08/19/24 1235   Temp 97.7    Pulse 71 08/19/24 1237   Resp 19 08/19/24 1237   SpO2 98 % 08/19/24 1237   Vitals shown include unfiled device data.    Electronically Signed By: CODEY JACINTO CRNA  August 19, 2024  12:39 PM

## 2024-08-19 NOTE — PROVIDER NOTIFICATION
Dr. Garcia on the phone inquiring about patient's rhythm. Another EKG was obtained and patient was placed on EKG monitoring. Rhythm was found to be Sinus Bradycardia. Also, notified that patient's blood pressure remains elevated.   MDA will be notified of findings. Dr. Garcia suggested to use hydralazine for BP

## 2024-08-19 NOTE — PROGRESS NOTES
PRIMARY DIAGNOSIS: Symptomatic Cholelithiasis  OUTPATIENT/OBSERVATION GOALS TO BE MET BEFORE DISCHARGE:  ADLs back to baseline: No    Activity and level of assistance: Ambulating independently.    Pain status: Improved-controlled with oral pain medications.    Return to near baseline physical activity: No     Discharge Planner Nurse   Safe discharge environment identified: No  Barriers to discharge: Yes

## 2024-08-19 NOTE — PROVIDER NOTIFICATION
Dr. Isidro CYR notified about the patient's heart rhythm and elevated blood pressure. Hydralazine was ordered and administered. Patient went to OR afterwards.

## 2024-08-19 NOTE — PROGRESS NOTES
Pt placed on BIPAP V60. Pt on Oxymask 8 lpm SPO2@ 100%. Pt very sleepy and has MAYELIN. Pt has a CPAP at home but did not bring with. MD wanted patient placed on CPAP/BIPAP. RT placed on BIPAP IPAP: 14, EPAP: 6, RR 16, FIO2 35%. I-time 1.00. Pt tolerating well SPO2 97%, HR 69, RR 18 ,a nd BS are clear and diminished in bases. -626 ml. Minute volume 9.1. Pt tolerating well and sleeping. RT to monitor and follow closely.   Mima Delgado, RT

## 2024-08-20 VITALS
TEMPERATURE: 97.8 F | BODY MASS INDEX: 40.8 KG/M2 | DIASTOLIC BLOOD PRESSURE: 86 MMHG | HEIGHT: 70 IN | OXYGEN SATURATION: 98 % | SYSTOLIC BLOOD PRESSURE: 147 MMHG | HEART RATE: 75 BPM | WEIGHT: 285 LBS | RESPIRATION RATE: 18 BRPM

## 2024-08-20 LAB
ALBUMIN SERPL BCG-MCNC: 4.5 G/DL (ref 3.5–5.2)
ALP SERPL-CCNC: 70 U/L (ref 40–150)
ALT SERPL W P-5'-P-CCNC: 151 U/L (ref 0–70)
AST SERPL W P-5'-P-CCNC: 87 U/L (ref 0–45)
BASOPHILS # BLD AUTO: 0 10E3/UL (ref 0–0.2)
BASOPHILS NFR BLD AUTO: 0 %
BILIRUB DIRECT SERPL-MCNC: 0.38 MG/DL (ref 0–0.3)
BILIRUB SERPL-MCNC: 2.3 MG/DL
EOSINOPHIL # BLD AUTO: 0.1 10E3/UL (ref 0–0.7)
EOSINOPHIL NFR BLD AUTO: 0 %
ERYTHROCYTE [DISTWIDTH] IN BLOOD BY AUTOMATED COUNT: 13.4 % (ref 10–15)
GLUCOSE BLDC GLUCOMTR-MCNC: 106 MG/DL (ref 70–99)
GLUCOSE BLDC GLUCOMTR-MCNC: 191 MG/DL (ref 70–99)
HCT VFR BLD AUTO: 44.8 % (ref 40–53)
HGB BLD-MCNC: 14.7 G/DL (ref 13.3–17.7)
IMM GRANULOCYTES # BLD: 0.1 10E3/UL
IMM GRANULOCYTES NFR BLD: 1 %
LYMPHOCYTES # BLD AUTO: 1.4 10E3/UL (ref 0.8–5.3)
LYMPHOCYTES NFR BLD AUTO: 10 %
MCH RBC QN AUTO: 29.9 PG (ref 26.5–33)
MCHC RBC AUTO-ENTMCNC: 32.8 G/DL (ref 31.5–36.5)
MCV RBC AUTO: 91 FL (ref 78–100)
MONOCYTES # BLD AUTO: 1.1 10E3/UL (ref 0–1.3)
MONOCYTES NFR BLD AUTO: 8 %
NEUTROPHILS # BLD AUTO: 11.7 10E3/UL (ref 1.6–8.3)
NEUTROPHILS NFR BLD AUTO: 81 %
NRBC # BLD AUTO: 0 10E3/UL
NRBC BLD AUTO-RTO: 0 /100
PLATELET # BLD AUTO: 144 10E3/UL (ref 150–450)
PROT SERPL-MCNC: 7.6 G/DL (ref 6.4–8.3)
RBC # BLD AUTO: 4.91 10E6/UL (ref 4.4–5.9)
WBC # BLD AUTO: 14.4 10E3/UL (ref 4–11)

## 2024-08-20 PROCEDURE — 250N000013 HC RX MED GY IP 250 OP 250 PS 637: Performed by: FAMILY MEDICINE

## 2024-08-20 PROCEDURE — 94660 CPAP INITIATION&MGMT: CPT

## 2024-08-20 PROCEDURE — 36415 COLL VENOUS BLD VENIPUNCTURE: CPT

## 2024-08-20 PROCEDURE — 82962 GLUCOSE BLOOD TEST: CPT

## 2024-08-20 PROCEDURE — 80076 HEPATIC FUNCTION PANEL: CPT

## 2024-08-20 PROCEDURE — 250N000013 HC RX MED GY IP 250 OP 250 PS 637: Performed by: SPECIALIST

## 2024-08-20 PROCEDURE — 999N000157 HC STATISTIC RCP TIME EA 10 MIN

## 2024-08-20 PROCEDURE — 250N000011 HC RX IP 250 OP 636: Performed by: FAMILY MEDICINE

## 2024-08-20 PROCEDURE — 85025 COMPLETE CBC W/AUTO DIFF WBC: CPT

## 2024-08-20 PROCEDURE — 99239 HOSP IP/OBS DSCHRG MGMT >30: CPT | Performed by: FAMILY MEDICINE

## 2024-08-20 RX ORDER — IBUPROFEN 600 MG/1
600 TABLET, FILM COATED ORAL
COMMUNITY
Start: 2024-08-20

## 2024-08-20 RX ORDER — METOPROLOL TARTRATE 25 MG/1
25 TABLET, FILM COATED ORAL 2 TIMES DAILY
Qty: 60 TABLET | Refills: 0 | Status: SHIPPED | OUTPATIENT
Start: 2024-08-20

## 2024-08-20 RX ORDER — HYDROCODONE BITARTRATE AND ACETAMINOPHEN 5; 325 MG/1; MG/1
1 TABLET ORAL EVERY 6 HOURS PRN
Qty: 18 TABLET | Refills: 0 | Status: SHIPPED | OUTPATIENT
Start: 2024-08-20 | End: 2024-08-25

## 2024-08-20 RX ADMIN — ACETAMINOPHEN 975 MG: 325 TABLET ORAL at 04:02

## 2024-08-20 RX ADMIN — POLYETHYLENE GLYCOL 3350 17 G: 17 POWDER, FOR SOLUTION ORAL at 08:51

## 2024-08-20 RX ADMIN — PANTOPRAZOLE SODIUM 40 MG: 40 TABLET, DELAYED RELEASE ORAL at 08:50

## 2024-08-20 RX ADMIN — PIPERACILLIN AND TAZOBACTAM 3.38 G: 3; .375 INJECTION, POWDER, FOR SOLUTION INTRAVENOUS at 08:51

## 2024-08-20 RX ADMIN — METOPROLOL TARTRATE 25 MG: 25 TABLET, FILM COATED ORAL at 08:50

## 2024-08-20 RX ADMIN — OXYCODONE HYDROCHLORIDE AND ACETAMINOPHEN 1 TABLET: 5; 325 TABLET ORAL at 08:50

## 2024-08-20 RX ADMIN — SENNOSIDES AND DOCUSATE SODIUM 1 TABLET: 50; 8.6 TABLET ORAL at 08:51

## 2024-08-20 ASSESSMENT — ACTIVITIES OF DAILY LIVING (ADL)
ADLS_ACUITY_SCORE: 18

## 2024-08-20 NOTE — DISCHARGE SUMMARY
Discharge Provider: SABI Rasmussen  Admission Date: 8/18/2024  Discharge Date: 8/20/2024     Primary Diagnosis at Discharge:   Patient Active Problem List   Diagnosis    Symptomatic cholelithiasis    Hypertension, unspecified type    Uncontrolled hypertension      Disposition: Home or Self Care  Condition at Discharge: Stable     Surgery: Laparoscopic cholecystectomy    Hospital Summary:   Mak Weiss was admitted for acute on chronic cholecystitis and underwent a laparoscopic cholecystectomy. During recovering in PACU with concern for hypoxia while drowsy history of MAYELIN requiring CPAP and subsequently been placed on BiPAP as well as experiencing sanguinous ooze from umbilical and epigastric incision with hemostasis obtained in PACU. Subsequently he had been admitted for observation overnight and had been transferred to the Med/Surg floor for the remainder of his stay.  On POD # 1, pain had been well-controlled he was discharged home tolerating a general diet, ambulating without assistance, and pain controlled with oral analgesics.        Discharge Medications:      Medication List        Started      HYDROcodone-acetaminophen 5-325 MG tablet  Commonly known as: NORCO  1 tablet, Oral, EVERY 6 HOURS PRN     ibuprofen 600 MG tablet  Commonly known as: ADVIL/MOTRIN  600 mg, Oral, ONCE PRN     metoprolol tartrate 25 MG tablet  Commonly known as: LOPRESSOR  25 mg, Oral, 2 TIMES DAILY              Physical Exam:  General appearance: patient seen resting in bed, no acute distress  Resp: no respiratory distress  Abdomen: soft, non-tender, non-distended.  Epigastric and periumbilical incision are clean dry and intact with evidence of previous dried blood with overlying compressive dressing that had been successfully removed.  Right upper quadrant surgical incisions are clean dry and intact.  Extremities: warm and well perfused.    Discharge Instructions:  Follow up appointment with Primary Care Physician: No  Ref-Primary, Physician    It is our practice to have all patients follow up with us 2-3 weeks after their surgery to ensure they are recovering well.  For straightforward laparoscopic procedures, this can be done either in clinic as a scheduled follow up appointment, or over the phone.  If you would like a scheduled follow up appointment in clinic, please call us at [phone] to schedule an appointment at your convenience.  If you would prefer to follow up with us by phone please let us know so that we may contact you 2-3 weeks following your procedure.    Post operative instructions:   Diet: Regular diet    Activity: You should continue to be active at home including ambulating frequently.  If possible try to limit the amount of time spent in bed.    Restrictions: You should avoid lifting anything more than 20  pounds or strenuous physical activity for a minimum of 2 weeks.  This is to help reduce the risk of hernia  formation at your port sites.  Walking does not count as strenuous physical activity.  You are safe to walk up and down stairs.  Following 2 weeks you may resume all normal physical activity.    Wound care: You may remove your outer dressings after a period of 48 hours. The small white strips on the incisions act like artificial scabs, and will begin to peel at the edges at around 7-10 days. These can then be removed.    Bathing: You may shower after 48 hours from surgery. It is ok to get your incisions wet, but avoid rubbing them. Avoid soaking in bath tubs, or swimming in lakes, pools, or streams for 2 weeks following surgery.      Dinora Conte PA-C  [facility]  [address] [address]

## 2024-08-20 NOTE — PROGRESS NOTES
PRIMARY DIAGNOSIS: Symptomatic Cholelithiasis   OUTPATIENT/OBSERVATION GOALS TO BE MET BEFORE DISCHARGE:  ADLs back to baseline: No    Activity and level of assistance: Ambulating independently.    Pain status: Improved-controlled with oral pain medications.    Return to near baseline physical activity: No     Discharge Planner Nurse   Safe discharge environment identified: No  Barriers to discharge: Yes    Pt A/Ox4, report minimal abdominal pain, managed appropriately with prn medication and ice pack, telemetry NSR, pt ambulated and void, tolerating full liquid, old dried drainage on lap site, IVF infusing, pt LS clear, pt request cpap on throughout the shift, encouraged cough and deep breathing, discharge pending.           Please review provider order for any additional goals.   Nurse to notify provider when observation goals have been met and patient is ready for discharge.

## 2024-08-20 NOTE — PROGRESS NOTES
General Surgery Progress Note:    Hospital Day # 0    ASSESSMENT:  1. Symptomatic cholelithiasis    2. Uncontrolled hypertension    3. Hypertension, unspecified type        Mak Weiss is a 39 year old male who is s/p laparoscopic cholecystectomy on 8/19/2024 who had been admitted due to concern for postoperative hypoxia requiring BiPAP.  This morning he is hemodynamically stable and afebrile on room air with pain well-controlled, currently tolerating a diet with evidence of return of bowel function.  Plan for discharge today    PLAN:  -Regular diet as tolerated  -Multimodal oral pain control  -Encourage activity/ambulation to promote bowel function  -Plan for discharge today    SUBJECTIVE:   He is reporting feeling overall however with intermittent right shoulder pain overnight has improved with denying abdominal pain, nausea, vomiting, fever, chills.  Endorses flatus but has not had a postoperative bowel movement.  Spontaneously voiding without difficulties.  Has had liquids for breakfast with plan to order breakfast.  States interest to go home to sleep in his bed.       Patient Vitals for the past 24 hrs:   BP Temp Temp src Pulse Resp SpO2   08/20/24 0900 -- -- -- 70 -- --   08/20/24 0748 -- -- -- 88 -- 96 %   08/20/24 0740 (!) 147/86 97.8  F (36.6  C) Oral 64 18 99 %   08/20/24 0417 -- -- -- 64 -- 99 %   08/20/24 0043 -- -- -- 56 -- 98 %   08/20/24 0001 126/60 97.5  F (36.4  C) Axillary -- 16 97 %   08/19/24 2043 (!) 145/71 -- -- 84 -- --   08/19/24 1937 -- -- -- 80 -- 96 %   08/19/24 1911 (!) 165/81 -- -- -- -- --   08/19/24 1832 129/70 -- -- 68 18 96 %   08/19/24 1743 -- -- -- -- -- 94 %   08/19/24 1732 (!) 144/77 -- -- 65 16 94 %   08/19/24 1700 129/76 -- -- 67 18 93 %   08/19/24 1629 (!) 148/73 -- -- 74 16 --   08/19/24 1628 -- -- -- -- -- 93 %   08/19/24 1623 -- -- -- -- -- 95 %   08/19/24 1600 (!) 157/83 -- Oral 70 16 98 %   08/19/24 1510 (!) 153/81 97  F (36.1  C) Temporal 63 15 100 %   08/19/24 1500  (!) 156/84 -- -- 76 17 --   08/19/24 1450 (!) 155/82 -- -- 63 16 98 %   08/19/24 1440 (!) 150/83 -- -- 63 16 98 %   08/19/24 1430 (!) 148/77 -- -- 64 16 97 %   08/19/24 1420 (!) 165/86 -- -- 70 18 100 %   08/19/24 1410 (!) 154/78 -- -- 80 22 99 %   08/19/24 1400 (!) 153/80 -- -- 63 12 98 %   08/19/24 1355 -- -- -- 69 -- 97 %   08/19/24 1350 (!) 165/79 -- -- 72 16 99 %   08/19/24 1340 (!) 156/73 -- -- 65 15 93 %   08/19/24 1330 (!) 156/78 -- -- 64 16 97 %   08/19/24 1325 -- -- -- -- -- 93 %   08/19/24 1322 -- -- -- 70 17 96 %   08/19/24 1320 (!) 153/80 -- -- 62 15 90 %   08/19/24 1310 (!) 148/71 -- -- 68 16 99 %   08/19/24 1300 (!) 161/76 -- -- 65 10 99 %   08/19/24 1250 (!) 161/77 -- -- 65 17 97 %   08/19/24 1240 (!) 166/80 -- -- 64 14 98 %   08/19/24 1223 (!) 164/79 97.7  F (36.5  C) Temporal -- 16 98 %         PHYSICAL EXAM:  General: patient seen resting in bed, no acute distress  Resp: no respiratory distress, breathing comfortably on room air  Abdomen: Obese, nondistended with mild right upper quadrant tenderness to palpation without rebound or guarding.  Multiple laparoscopic surgical incisions are clean dry and intact with overlying Steri-Strips however umbilical incision epigastric incision with overlying compressive gauze that is hemostatic.   Output by Drain (mL) 08/18/24 0700 - 08/18/24 1459 08/18/24 1500 - 08/18/24 2259 08/18/24 2300 - 08/19/24 0659 08/19/24 0700 - 08/19/24 1459 08/19/24 1500 - 08/19/24 2259 08/19/24 2300 - 08/20/24 0659 08/20/24 0700 - 08/20/24 1200   Patient has no LDAs of requested type attached.      Extremities: warm and well perfused    08/19 0700 - 08/20 0659  In: 685 [P.O.:485; I.V.:200]  Out: 1270 [Urine:1250]    Admission on 08/18/2024   Component Date Value    Sodium 08/18/2024 141     Potassium 08/18/2024 3.6     Carbon Dioxide (CO2) 08/18/2024 27     Anion Gap 08/18/2024 11     Urea Nitrogen 08/18/2024 10.2     Creatinine 08/18/2024 0.98     GFR Estimate 08/18/2024 >90      Calcium 08/18/2024 8.8     Chloride 08/18/2024 103     Glucose 08/18/2024 108 (H)     Alkaline Phosphatase 08/18/2024 58     AST 08/18/2024 21     ALT 08/18/2024 34     Protein Total 08/18/2024 8.1     Albumin 08/18/2024 4.8     Bilirubin Total 08/18/2024 1.8 (H)     Lipase 08/18/2024 21     WBC Count 08/18/2024 10.5     RBC Count 08/18/2024 5.05     Hemoglobin 08/18/2024 15.1     Hematocrit 08/18/2024 44.4     MCV 08/18/2024 88     MCH 08/18/2024 29.9     MCHC 08/18/2024 34.0     RDW 08/18/2024 13.0     Platelet Count 08/18/2024 136 (L)     % Neutrophils 08/18/2024 69     % Lymphocytes 08/18/2024 21     % Monocytes 08/18/2024 8     % Eosinophils 08/18/2024 1     % Basophils 08/18/2024 1     % Immature Granulocytes 08/18/2024 1     NRBCs per 100 WBC 08/18/2024 0     Absolute Neutrophils 08/18/2024 7.3     Absolute Lymphocytes 08/18/2024 2.2     Absolute Monocytes 08/18/2024 0.8     Absolute Eosinophils 08/18/2024 0.1     Absolute Basophils 08/18/2024 0.1     Absolute Immature Granul* 08/18/2024 0.1     Absolute NRBCs 08/18/2024 0.0     INR 08/19/2024 1.08     aPTT 08/19/2024 31     Systolic Blood Pressure 08/19/2024 153     Diastolic Blood Pressure 08/19/2024 80     Ventricular Rate 08/19/2024 45     Atrial Rate 08/19/2024 54     QRS Duration 08/19/2024 102     QT 08/19/2024 470     QTc 08/19/2024 406     R AXIS 08/19/2024 31     T [facility] 08/19/2024 14     Interpretation ECG 08/19/2024                      Value:Junctional rhythm  Abnormal ECG  No previous ECGs available  Confirmed by SEE ED PROVIDER NOTE FOR, ECG INTERPRETATION ([id number]),  JESUS ALBERTO DUFFY ([id number]) on 8/19/2024 4:41:46 AM      Ventricular Rate 08/19/2024 49     Atrial Rate 08/19/2024 49     NV Interval 08/19/2024 170     QRS Duration 08/19/2024 108     QT 08/19/2024 480     QTc 08/19/2024 433     P Axis 08/19/2024 36     R AXIS 08/19/2024 -5     T [facility] 08/19/2024 21     Interpretation ECG 08/19/2024                      Value:Sinus  bradycardia  Incomplete right bundle branch block  Minimal voltage criteria for LVH, may be normal variant  Abnormal ECG  When compared with ECG of 19-Aug-2024 02:33,  Sinus rhythm has replaced Junctional rhythm  Incomplete right bundle branch block is now Present  Confirmed by DULCE DAVIS MD LOC:JN ([id number]) on 8/19/2024 10:40:58 AM      GLUCOSE BY METER POCT 08/20/2024 106 (H)     Protein Total 08/20/2024 7.6     Albumin 08/20/2024 4.5     Bilirubin Total 08/20/2024 2.3 (H)     Alkaline Phosphatase 08/20/2024 70     AST 08/20/2024 87 (H)     ALT 08/20/2024 151 (H)     Bilirubin Direct 08/20/2024 0.38 (H)     WBC Count 08/20/2024 14.4 (H)     RBC Count 08/20/2024 4.91     Hemoglobin 08/20/2024 14.7     Hematocrit 08/20/2024 44.8     MCV 08/20/2024 91     MCH 08/20/2024 29.9     MCHC 08/20/2024 32.8     RDW 08/20/2024 13.4     Platelet Count 08/20/2024 144 (L)     % Neutrophils 08/20/2024 81     % Lymphocytes 08/20/2024 10     % Monocytes 08/20/2024 8     % Eosinophils 08/20/2024 0     % Basophils 08/20/2024 0     % Immature Granulocytes 08/20/2024 1     NRBCs per 100 WBC 08/20/2024 0     Absolute Neutrophils 08/20/2024 11.7 (H)     Absolute Lymphocytes 08/20/2024 1.4     Absolute Monocytes 08/20/2024 1.1     Absolute Eosinophils 08/20/2024 0.1     Absolute Basophils 08/20/2024 0.0     Absolute Immature Granul* 08/20/2024 0.1     Absolute NRBCs 08/20/2024 0.0     GLUCOSE BY METER POCT 08/20/2024 191 (H)         Dinora Conte PA-C  [facility]  [address]  [address]  [place] [address]

## 2024-08-20 NOTE — DISCHARGE SUMMARY
"[facility] MEDICINE  DISCHARGE SUMMARY     Primary Care Physician: No Ref-Primary, Physician  Admission Date: 8/18/2024   Discharge Provider: Maxwell Garcia MD Discharge Date: 8/20/2024    Diet:   Active Diet and Nourishment Order   Procedures    Regular Diet Adult    Diet     Code Status: Full Code   Activity: DCACTIVITY: Activity as tolerated  Avoid strenuous activity after cholecystectomy      Condition at Discharge: Stable     REASON FOR PRESENTATION(See Admission Note for Details)   Significant right upper quadrant pain    PRINCIPAL & ACTIVE DISCHARGE DIAGNOSES     Active Problems:    Symptomatic cholelithiasis    Hypertension, unspecified type    Uncontrolled hypertension  Hypertensive urgency  Paroxysmal junctional rhythm  Severe morbid obesity  Obstructive sleep apnea  Hepatic steatosis    Clinically Significant Risk Factors Present on Admission                  # Hypertension: Noted on problem list         # Severe Obesity: Estimated body mass index is 40.89 kg/m  as calculated from the following:    Height as of this encounter: 1.778 m (5' 10\").    Weight as of this encounter: 129.3 kg (285 lb).                    PENDING LABS     Unresulted Labs Ordered in the Past 30 Days of this Admission       Date and Time Order Name Status Description    8/19/2024 11:45 AM Surgical Pathology Exam In process           PROCEDURES ( this hospitalization only)    Procedure(s):  CHOLECYSTECTOMY, LAPAROSCOPIC      RECOMMENDATIONS TO OUTPATIENT PROVIDER FOR F/U VISIT   Follow-up Appointments     Follow Up      1.  Establish with primary care provider in the area to recheck blood   pressure.  Recommend seeing someone with epic computer system.  2.  Recommend outpatient echocardiogram for temporary junctional rhythm   detected at admission.  Primary care provider can coordinate.              DISPOSITION     Home    SUMMARY OF HOSPITAL COURSE:      Mak Weiss is a 39 year old  male " with history of sleep apnea, compliant with CPAP use, HTN off of meds, came with epigastric and right upper quadrant abdominal pain off and on for last 2 days.  Found to have cholelithiasis.  Also noted to have severe hypertensive urgency with systolic readings over 200.  Admitted.      1.  GI.  General surgery consulted and underwent laparoscopic cholecystectomy due to symptomatic cholelithiasis.  Postoperatively did well.  Tolerating regular diet without difficulty.      2.  Cardiovascular.  As noted above was hypertensive urgency systolic reading over 200.  Received lisinopril hydrochlorothiazide 20/25 mg with minimal improvement.  Later was also given metoprolol 25 mg p.o. prior to surgery.  Admission EKG reported as junctional rhythm.  After blood pressure improvement repeat EKG showed sinus rhythm with incomplete right bundle branch block.  No events on telemetry.  Blood pressure under improved control with metoprolol only.  Continue at discharge.  Recommend follow-up with primary care provider and outpatient echocardiogram.    Otherwise medically did well.    Discharge Medications with Med changes:     Current Discharge Medication List        START taking these medications    Details   HYDROcodone-acetaminophen (NORCO) 5-325 MG tablet Take 1 tablet by mouth every 6 hours as needed for pain  Qty: 18 tablet, Refills: 0    Associated Diagnoses: Symptomatic cholelithiasis      ibuprofen (ADVIL/MOTRIN) 600 MG tablet Take 1 tablet (600 mg) by mouth once as needed for moderate pain or mild pain    Associated Diagnoses: Symptomatic cholelithiasis      metoprolol tartrate (LOPRESSOR) 25 MG tablet Take 1 tablet (25 mg) by mouth 2 times daily  Qty: 60 tablet, Refills: 0    Associated Diagnoses: Hypertension, unspecified type               Rationale for medication changes:      Pain control.  Metoprolol for hypertension.      Consults     SURGERY GENERAL IP CONSULT  HOSPITALIST IP CONSULT      Immunizations given this  "encounter     Most Recent Immunizations   Administered Date(s) Administered    COVID-19 MONOVALENT 12+ (Pfizer) 10/15/2021    Influenza Vaccine >6 months,quad, PF 11/23/2015    TDAP (Adacel,Boostrix) 11/01/2014           Anticoagulation Information      Recent INR results:   Recent Labs   Lab 08/19/24  0038   INR 1.08     Warfarin doses (if applicable) or name of other anticoagulant: N/A      SIGNIFICANT IMAGING FINDINGS     Results for orders placed or performed during the hospital encounter of 08/18/24   US Abdomen Limited (RUQ)    Impression    IMPRESSION:  1.  Cholelithiasis.    2.  Hepatic steatosis.           No results found for this or any previous visit (from the past 4320 hour(s)).    SIGNIFICANT LABORATORY FINDINGS     Most Recent 3 CBC's:  Recent Labs   Lab Test 08/20/24  0957 08/18/24  2300   WBC 14.4* 10.5   HGB 14.7 15.1   MCV 91 88   * 136*     Most Recent 3 BMP's:  Recent Labs   Lab Test 08/20/24  1114 08/20/24  0639 08/18/24  2300   NA  --   --  141   POTASSIUM  --   --  3.6   CHLORIDE  --   --  103   CO2  --   --  27   BUN  --   --  10.2   CR  --   --  0.98   ANIONGAP  --   --  11   JESSICA  --   --  8.8   * 106* 108*     Most Recent 2 LFT's:  Recent Labs   Lab Test 08/20/24  0957 08/18/24  2300   AST 87* 21   * 34   ALKPHOS 70 58   BILITOTAL 2.3* 1.8*     Most Recent 3 INR's:  Recent Labs   Lab Test 08/19/24  0038   INR 1.08     Most Recent 3 BNP's:No lab results found.  Most Recent TSH and T4:No lab results found.  Most Recent Hemoglobin A1c:No lab results found.  Most Recent 6 glucoses:  Recent Labs   Lab Test 08/20/24  1114 08/20/24  0639 08/18/24  2300   * 106* 108*     Most Recent Urinalysis:No lab results found.  Most Recent ESR & CRP:No lab results found.  No results found for: \"CTROPT\"   7-Day Micro Results       No results found for the last 168 hours.              Discharge Orders        When to call - Contact Surgeon Team    You may experience symptoms that " require follow-up before your scheduled appointment. Contact your Surgeon Team if you are concerned about pain control, large amount of bleeding, blood clots, constipation, or if you experience signs of infection (fever, growing tenderness at the surgery site, a large amount of drainage, severe pain, foul-smelling drainage, redness or swelling.  Please call [phone] if you do have any questions or concerns     When to call - Reasons to Call 911    Call 911 immediately if you experience sudden-onset chest pain, arm weakness/numbness, slurred speech, or shortness of breath     Symptoms - Fever Management    A low grade fever can be expected after surgery. Your Provider many have prescribed an Opioid pain medication that also contains acetaminophen (TYLENOL) that may help with Fever management.  Do NOT take additional acetaminophen (TYLENOL) in combination with an Opioid/acetaminophen (TYLENOL) product. Read the labels on your Over The Counter (OTC) medications with care.     Diet Instructions    You may drink clear liquids (apple juice, ginger ale, 7-up, broth, etc.), and progress to your regular diet as you feel able. It is important to stay well-hydrated after surgery and drink plenty of water.     Shower/Bathing - Restrictions: Let water run over incisions and pat dry. No tub baths until bleeding stops.    Shower/Bathing - Restrictions: Let water run over incisions and pat dry. Do NOT soak in any body of water (lake, pool, bath, etc.) for 7-10 days postoperatively.     Dressing / Wound Care - Wound    Remove Band-Aids in 1 to 2 days.  Leave Steri-Strips for 7 to 10 days.  The sutures are underneath the skin and will dissolve on their own.     Discharge Instructions - Comfort and Pain Management    Pain after surgery is normal and expected. You will have some amount of pain after surgery. Your pain will improve with time. There are several things you can do to help reduce your pain including: rest, ice, and  using pain medications as needed. Use pain interventions and don't wait until pain level is out of control. Contact your Surgeon Team if you have pain that persists or worsens after surgery despite rest, ice, and taking your medication(s) as prescribed. You may have a dry mouth, a sore throat, muscles aches or trouble sleeping, and these symptoms should go away after 24 hours.     Discharge Instructions - Rest    Rest and relax for the next 24 hours. Make arrangements to have someone stay with you overnight, and avoid hazardous and strenuous activities.  Do NOT make any important decisions for the next 24 hours.     Return to normal activity as tolerated    Return to normal activity as tolerated     May return to work (WITHOUT restrictions)    May return to work as you feel up to it.  Everyone is different.  Average time off work is approximately 1 week.     Reason for your hospital stay    Laparoscopic Cholecystectomy     Activity    Your activity upon discharge: no heavy lifting, pushing, pulling with the implant side for 2 weeks greater than 20 lbs     Follow Up    1.  Establish with primary care provider in the area to recheck blood pressure.  Recommend seeing someone with epic computer system.  2.  Recommend outpatient echocardiogram for temporary junctional rhythm detected at admission.  Primary care provider can coordinate.     Diet    Follow this diet upon discharge: Orders Placed This Encounter      Regular Diet Adult       Examination   Physical Exam   Temp:  [97  F (36.1  C)-97.8  F (36.6  C)] 97.8  F (36.6  C)  Pulse:  [56-88] 70  Resp:  [10-22] 18  BP: (126-166)/(60-86) 147/86  FiO2 (%):  [25 %-35 %] 25 %  SpO2:  [90 %-100 %] 96 %  Wt Readings from Last 4 Encounters:   08/18/24 129.3 kg (285 lb)   04/15/24 127 kg (280 lb)       Constitutional: awake, alert, cooperative, no apparent distress, and appears stated age and morbidly obese.  Eyes: Lids and lashes normal, pupils equal, round and reactive to  light, extra ocular muscles intact, sclera clear, conjunctiva normal  ENT: Normocephalic, without obvious abnormality, atraumatic, sinuses nontender on palpation, external ears without lesions, oral pharynx with moist mucous membranes, tonsils without erythema or exudates, gums normal and good dentition.  Respiratory: No increased work of breathing, good air exchange, clear to auscultation bilaterally, no crackles or wheezing  Cardiovascular: Normal apical impulse, regular rate and rhythm, normal S1 and S2, no S3 or S4, and no murmur noted  GI: Positive bowel sounds soft mild distention.  Surgical dressings left intact.  Incisional pain only.  Skin: normal skin color, texture, turgor, no redness, warmth, or swelling, and no rashes  Musculoskeletal: There is no redness, warmth, or swelling of the joints.  Full range of motion noted.  Motor strength is 5 out of 5 all extremities bilaterally.  Tone is normal. no lower extremity pitting edema present  Neurologic: Cranial nerves II-XII are grossly intact. Sensory:  Sensory intact  Neuropsychiatric: General: normal, calm, and normal eye contact Level of consciousness: alert / normal Affect: normal Orientation: oriented to self, place, time and situation Memory and insight: normal, memory for past and recent events intact, and thought process normal      Please see EMR for more detailed significant labs, imaging, consultant notes etc.    IMaxwell MD, personally saw the patient today and spent greater than 30 minutes discharging this patient.    Maxwell Garcia MD  [facility]    CC:No Ref-Primary, Physician

## 2024-08-20 NOTE — PLAN OF CARE
PRIMARY DIAGNOSIS: BILIARY COLIC/UNCOMPLICATED EARLY ACUTE CHOLECYSTITIS  OUTPATIENT/OBSERVATION GOALS TO BE MET BEFORE DISCHARGE:    1. Pain status: Improved-controlled with oral pain medications.  2. Stable vital signs and labs (if performed) at disposition: Yes  3. Tolerating adequate PO diet: Yes  4. Successful cholecystectomy or clear follow up plan with General Surgery team if immediate surgery not performed Yes  5. ADLs back to baseline?  Yes  6. Activity and level of assistance: Ambulating independently.  7. Barriers to discharge noted No    Discharge Planner Nurse   Safe discharge environment identified: Yes  Barriers to discharge: No       Entered by: Renetta Perez RN 08/20/2024 10:12 AM     Please review provider order for any additional goals.   Nurse to notify provider when observation goals have been met and patient is ready for discharge.  Goal Outcome Evaluation:

## 2024-08-20 NOTE — PROGRESS NOTES
Pt wore the bipap all night and tolerated well. BiPAP setting: ST 14/6 RR16 25%. Pt's sat on this setting in upper 90's. RT will continue to follow.

## 2024-08-20 NOTE — PLAN OF CARE
Goal Outcome Evaluation:    Pt A&Ox4, VSS, has used his CPAP over night, Afebrile.  Pt c/o abd pain at lap sites, declined pain meds for me except his scheduled tylenol.  Pt denies SOB, N/V/D.  PIV patent and infusing NS 75mL/hr.  Pt SBA..  Pt care ongoing, call light within reach, calls appropriately.   Pt slept in the recliner last night, says his shoulder was getting sore from sleeping in the hospital bed.     Problem: Adult Inpatient Plan of Care  Goal: Optimal Comfort and Wellbeing  Outcome: Progressing

## 2024-08-20 NOTE — DISCHARGE INSTRUCTIONS
It is our practice to have all patients follow up with us 2-3 weeks after their surgery to ensure they are recovering well.  For straightforward laparoscopic procedures, this can be done either in clinic as a scheduled follow up appointment, or over the phone.  If you would like a scheduled follow up appointment in clinic, please call us at [phone] to schedule an appointment at your convenience.  If you would prefer to follow up with us by phone please let us know so that we may contact you 2-3 weeks following your procedure.    Post operative instructions:   Diet: Regular diet    Activity: You should continue to be active at home including ambulating frequently.  If possible try to limit the amount of time spent in bed.    Restrictions: You should avoid lifting anything more than 20  pounds or strenuous physical activity for a minimum of 2 weeks.  This is to help reduce the risk of hernia  formation at your port sites.  Walking does not count as strenuous physical activity.  You are safe to walk up and down stairs.  Following 2 weeks you may resume all normal physical activity.    Wound care: You may remove your outer dressings after a period of 48 hours. The small white strips on the incisions act like artificial scabs, and will begin to peel at the edges at around 7-10 days. These can then be removed.    Bathing: You may shower after 48 hours from surgery. It is ok to get your incisions wet, but avoid rubbing them. Avoid soaking in bath tubs, or swimming in lakes, pools, or streams for 2 weeks following surgery.         37.2

## 2024-08-22 LAB
PATH REPORT.COMMENTS IMP SPEC: NORMAL
PATH REPORT.COMMENTS IMP SPEC: NORMAL
PATH REPORT.FINAL DX SPEC: NORMAL
PATH REPORT.GROSS SPEC: NORMAL
PATH REPORT.MICROSCOPIC SPEC OTHER STN: NORMAL
PATH REPORT.RELEVANT HX SPEC: NORMAL
PHOTO IMAGE: NORMAL

## 2024-08-23 ENCOUNTER — TELEPHONE (OUTPATIENT)
Dept: SURGERY | Facility: CLINIC | Age: 39
End: 2024-08-23

## 2024-08-23 NOTE — TELEPHONE ENCOUNTER
[facility] Post-Op Phone Call               Surgeon: Christine Louie MD    Surgery: Laparoscopic Cholecystectomy  Date of Surgery: 8/19/2024  Discharge Date: 8/20/2024    Date/Time Called:   Date: 8/23/2024 Time: 1:15 PM   Attempt: First    Pain Control:  Intensity: Mild (1 - 3)  Duration/Location/Explain: Minimal pain at this time that is well managed at home.     Incisions:  Drainage? clean and dry  Any fever type symptoms? No  Comment: Scant drainage coming from bottom incision but has been keeping clean, dry, and changing dressing PRN. Patient has no other concerns with incision sites.    GI:  Nausea? No  Vomiting? No  BM? Yes  Gas? Yes  Voiding Frequency? 4 or more/day   Appetite? Good    Activity:  Walking activity? Yes  Frequency/Type: Ambulating frequently as tolerated.  Restrictions: Return to normal activity as tolerated.    Return to Work Plans?  Do you need anything from us in this regard? No     Post-op appointment made? No      Thank you for your time. Please do not hesitate to call us with any questions or concerns.    Call completed by: Sharita LOGAN RN

## (undated) DEVICE — ENDO SHEARS RENEW LAP ENDOCUT SCISSOR TIP 16.5MM 3142

## (undated) DEVICE — PREP CHLORAPREP 26ML TINTED HI-LITE ORANGE 930815

## (undated) DEVICE — SOL RINGERS LACTATED 1000ML BAG 2B2324X

## (undated) DEVICE — NDL INSUFFLATION 13GA 120MM C2201

## (undated) DEVICE — ENDO TROCAR SLEEVE KII Z-THREADED 05X100MM CTS02

## (undated) DEVICE — ENDO TROCAR FIRST ENTRY KII FIOS Z-THRD 11X100MM CTF33

## (undated) DEVICE — SU MONOCRYL+ 4-0 18IN PS2 UND MCP496G

## (undated) DEVICE — CUSTOM PACK LAP CHOLE SBA5BLCHEA

## (undated) DEVICE — ELECTRODE PATIENT RETURN ADULT L10 FT 2 PLATE CORD 0855C

## (undated) DEVICE — ENDO POUCH UNIV RETRIEVAL SYSTEM INZII 10MM CD001

## (undated) DEVICE — CLIP LIGACLIP LG YELLOW LT400

## (undated) DEVICE — SURGICEL ABSORBABLE HEMOSTAT SNOW 4"X4" 2083

## (undated) DEVICE — GLOVE PI ULTRATCH M LF SZ 6.5 PF CUFF TEXT STRL LF 42665

## (undated) DEVICE — CLIP APPLIER ENDO ROTATING 10MM MED/LG ER320

## (undated) DEVICE — SUTURE PASSOR W/GUIDE RSG-14F-4-WG

## (undated) DEVICE — ESU CORD MONOPOLAR 10'  E0510

## (undated) DEVICE — GOWN LG DISP 9515

## (undated) DEVICE — SUCTION MANIFOLD NEPTUNE 2 SYS 1 PORT 702-025-000

## (undated) DEVICE — SUTURE VICRYL+ 0 27IN CT-1 UND VCP260H

## (undated) DEVICE — TUBING SMOKE EVAC PNEUMOCLEAR HIGH FLOW 0620050250

## (undated) DEVICE — ENDO TROCAR FIRST ENTRY KII FIOS Z-THRD 05X100MM CTF03

## (undated) DEVICE — SUCTION STRYKERFLOW II 250-070-500

## (undated) DEVICE — SOL WATER IRRIG 1000ML BOTTLE 2F7114

## (undated) DEVICE — BLADE CLIPPER DISP 4406

## (undated) RX ORDER — ONDANSETRON 2 MG/ML
INJECTION INTRAMUSCULAR; INTRAVENOUS
Status: DISPENSED
Start: 2024-08-19

## (undated) RX ORDER — LIDOCAINE HYDROCHLORIDE 10 MG/ML
INJECTION, SOLUTION EPIDURAL; INFILTRATION; INTRACAUDAL; PERINEURAL
Status: DISPENSED
Start: 2024-08-19

## (undated) RX ORDER — DEXAMETHASONE SODIUM PHOSPHATE 4 MG/ML
INJECTION, SOLUTION INTRA-ARTICULAR; INTRALESIONAL; INTRAMUSCULAR; INTRAVENOUS; SOFT TISSUE
Status: DISPENSED
Start: 2024-08-19

## (undated) RX ORDER — FENTANYL CITRATE 50 UG/ML
INJECTION, SOLUTION INTRAMUSCULAR; INTRAVENOUS
Status: DISPENSED
Start: 2024-08-19